# Patient Record
Sex: FEMALE | Race: WHITE | NOT HISPANIC OR LATINO | Employment: UNEMPLOYED | URBAN - METROPOLITAN AREA
[De-identification: names, ages, dates, MRNs, and addresses within clinical notes are randomized per-mention and may not be internally consistent; named-entity substitution may affect disease eponyms.]

---

## 2017-03-23 ENCOUNTER — COMMUNICATION - HEALTHEAST (OUTPATIENT)
Dept: BEHAVIORAL HEALTH | Facility: CLINIC | Age: 42
End: 2017-03-23

## 2017-03-23 DIAGNOSIS — F41.9 ANXIETY: ICD-10-CM

## 2017-06-12 ENCOUNTER — OFFICE VISIT - HEALTHEAST (OUTPATIENT)
Dept: BEHAVIORAL HEALTH | Facility: CLINIC | Age: 42
End: 2017-06-12

## 2017-06-12 DIAGNOSIS — F06.30 MOOD DISORDER IN CONDITIONS CLASSIFIED ELSEWHERE: ICD-10-CM

## 2017-06-12 ASSESSMENT — MIFFLIN-ST. JEOR: SCORE: 1976.05

## 2017-07-26 ENCOUNTER — COMMUNICATION - HEALTHEAST (OUTPATIENT)
Dept: BEHAVIORAL HEALTH | Facility: CLINIC | Age: 42
End: 2017-07-26

## 2017-07-26 DIAGNOSIS — F41.9 ANXIETY: ICD-10-CM

## 2017-07-31 ENCOUNTER — COMMUNICATION - HEALTHEAST (OUTPATIENT)
Dept: BEHAVIORAL HEALTH | Facility: CLINIC | Age: 42
End: 2017-07-31

## 2017-07-31 DIAGNOSIS — F41.9 ANXIETY: ICD-10-CM

## 2017-10-16 ENCOUNTER — OFFICE VISIT - HEALTHEAST (OUTPATIENT)
Dept: BEHAVIORAL HEALTH | Facility: CLINIC | Age: 42
End: 2017-10-16

## 2017-10-16 DIAGNOSIS — F06.30 MOOD DISORDER IN CONDITIONS CLASSIFIED ELSEWHERE: ICD-10-CM

## 2017-10-16 ASSESSMENT — MIFFLIN-ST. JEOR: SCORE: 1962.44

## 2018-02-08 ENCOUNTER — COMMUNICATION - HEALTHEAST (OUTPATIENT)
Dept: BEHAVIORAL HEALTH | Facility: CLINIC | Age: 43
End: 2018-02-08

## 2018-02-08 DIAGNOSIS — F06.30 MOOD DISORDER IN CONDITIONS CLASSIFIED ELSEWHERE: ICD-10-CM

## 2018-02-19 ENCOUNTER — COMMUNICATION - HEALTHEAST (OUTPATIENT)
Dept: BEHAVIORAL HEALTH | Facility: CLINIC | Age: 43
End: 2018-02-19

## 2018-02-26 ENCOUNTER — COMMUNICATION - HEALTHEAST (OUTPATIENT)
Dept: BEHAVIORAL HEALTH | Facility: CLINIC | Age: 43
End: 2018-02-26

## 2018-02-26 DIAGNOSIS — F41.9 ANXIETY: ICD-10-CM

## 2018-04-24 ENCOUNTER — COMMUNICATION - HEALTHEAST (OUTPATIENT)
Dept: BEHAVIORAL HEALTH | Facility: CLINIC | Age: 43
End: 2018-04-24

## 2018-04-24 DIAGNOSIS — F06.30 MOOD DISORDER IN CONDITIONS CLASSIFIED ELSEWHERE: ICD-10-CM

## 2019-01-16 ENCOUNTER — COMMUNICATION - HEALTHEAST (OUTPATIENT)
Dept: BEHAVIORAL HEALTH | Facility: CLINIC | Age: 44
End: 2019-01-16

## 2019-01-16 DIAGNOSIS — F41.9 ANXIETY: ICD-10-CM

## 2019-01-17 ENCOUNTER — COMMUNICATION - HEALTHEAST (OUTPATIENT)
Dept: BEHAVIORAL HEALTH | Facility: CLINIC | Age: 44
End: 2019-01-17

## 2019-01-28 ENCOUNTER — OFFICE VISIT - HEALTHEAST (OUTPATIENT)
Dept: BEHAVIORAL HEALTH | Facility: CLINIC | Age: 44
End: 2019-01-28

## 2019-01-28 DIAGNOSIS — F06.30 MOOD DISORDER IN CONDITIONS CLASSIFIED ELSEWHERE: ICD-10-CM

## 2019-01-28 DIAGNOSIS — F41.9 ANXIETY: ICD-10-CM

## 2019-01-28 ASSESSMENT — MIFFLIN-ST. JEOR: SCORE: 1953.37

## 2019-01-29 ENCOUNTER — COMMUNICATION - HEALTHEAST (OUTPATIENT)
Dept: BEHAVIORAL HEALTH | Facility: CLINIC | Age: 44
End: 2019-01-29

## 2019-07-10 ENCOUNTER — COMMUNICATION - HEALTHEAST (OUTPATIENT)
Dept: BEHAVIORAL HEALTH | Facility: CLINIC | Age: 44
End: 2019-07-10

## 2019-07-10 DIAGNOSIS — F41.9 ANXIETY: ICD-10-CM

## 2019-08-13 ENCOUNTER — COMMUNICATION - HEALTHEAST (OUTPATIENT)
Dept: BEHAVIORAL HEALTH | Facility: CLINIC | Age: 44
End: 2019-08-13

## 2019-08-19 ENCOUNTER — OFFICE VISIT - HEALTHEAST (OUTPATIENT)
Dept: BEHAVIORAL HEALTH | Facility: CLINIC | Age: 44
End: 2019-08-19

## 2019-08-19 DIAGNOSIS — F41.9 ANXIETY: ICD-10-CM

## 2019-08-19 DIAGNOSIS — F06.30 MOOD DISORDER IN CONDITIONS CLASSIFIED ELSEWHERE: ICD-10-CM

## 2019-08-19 ASSESSMENT — MIFFLIN-ST. JEOR: SCORE: 1962.44

## 2020-02-28 ENCOUNTER — COMMUNICATION - HEALTHEAST (OUTPATIENT)
Dept: BEHAVIORAL HEALTH | Facility: CLINIC | Age: 45
End: 2020-02-28

## 2020-02-28 ENCOUNTER — OFFICE VISIT - HEALTHEAST (OUTPATIENT)
Dept: BEHAVIORAL HEALTH | Facility: CLINIC | Age: 45
End: 2020-02-28

## 2020-02-28 DIAGNOSIS — F39 MOOD DISORDER (H): ICD-10-CM

## 2020-02-28 DIAGNOSIS — F41.9 ANXIETY: ICD-10-CM

## 2020-02-28 ASSESSMENT — MIFFLIN-ST. JEOR: SCORE: 1962.44

## 2020-06-25 ENCOUNTER — COMMUNICATION - HEALTHEAST (OUTPATIENT)
Dept: BEHAVIORAL HEALTH | Facility: CLINIC | Age: 45
End: 2020-06-25

## 2020-06-25 DIAGNOSIS — F06.30 MOOD DISORDER IN CONDITIONS CLASSIFIED ELSEWHERE: ICD-10-CM

## 2020-07-03 ENCOUNTER — COMMUNICATION - HEALTHEAST (OUTPATIENT)
Dept: BEHAVIORAL HEALTH | Facility: CLINIC | Age: 45
End: 2020-07-03

## 2020-07-06 ENCOUNTER — OFFICE VISIT - HEALTHEAST (OUTPATIENT)
Dept: BEHAVIORAL HEALTH | Facility: CLINIC | Age: 45
End: 2020-07-06

## 2020-07-06 DIAGNOSIS — F41.9 ANXIETY: ICD-10-CM

## 2020-07-06 DIAGNOSIS — F39 MOOD DISORDER (H): ICD-10-CM

## 2020-07-20 ENCOUNTER — COMMUNICATION - HEALTHEAST (OUTPATIENT)
Dept: BEHAVIORAL HEALTH | Facility: CLINIC | Age: 45
End: 2020-07-20

## 2020-07-20 DIAGNOSIS — F06.30 MOOD DISORDER IN CONDITIONS CLASSIFIED ELSEWHERE: ICD-10-CM

## 2020-08-19 ENCOUNTER — OFFICE VISIT - HEALTHEAST (OUTPATIENT)
Dept: FAMILY MEDICINE | Facility: CLINIC | Age: 45
End: 2020-08-19

## 2020-08-19 ENCOUNTER — RECORDS - HEALTHEAST (OUTPATIENT)
Dept: GENERAL RADIOLOGY | Facility: CLINIC | Age: 45
End: 2020-08-19

## 2020-08-19 DIAGNOSIS — S69.91XA UNSPECIFIED INJURY OF RIGHT WRIST, HAND AND FINGER(S), INITIAL ENCOUNTER: ICD-10-CM

## 2020-08-19 DIAGNOSIS — S69.91XA INJURY OF FINGER OF RIGHT HAND, INITIAL ENCOUNTER: ICD-10-CM

## 2020-08-19 DIAGNOSIS — S63.612A SPRAIN OF RIGHT MIDDLE FINGER, UNSPECIFIED SITE OF DIGIT, INITIAL ENCOUNTER: ICD-10-CM

## 2020-08-31 ENCOUNTER — COMMUNICATION - HEALTHEAST (OUTPATIENT)
Dept: BEHAVIORAL HEALTH | Facility: CLINIC | Age: 45
End: 2020-08-31

## 2020-08-31 DIAGNOSIS — F06.30 MOOD DISORDER IN CONDITIONS CLASSIFIED ELSEWHERE: ICD-10-CM

## 2020-10-01 ENCOUNTER — COMMUNICATION - HEALTHEAST (OUTPATIENT)
Dept: BEHAVIORAL HEALTH | Facility: CLINIC | Age: 45
End: 2020-10-01

## 2020-10-01 DIAGNOSIS — F41.9 ANXIETY: ICD-10-CM

## 2020-10-01 DIAGNOSIS — F06.30 MOOD DISORDER IN CONDITIONS CLASSIFIED ELSEWHERE: ICD-10-CM

## 2020-10-14 ENCOUNTER — COMMUNICATION - HEALTHEAST (OUTPATIENT)
Dept: BEHAVIORAL HEALTH | Facility: CLINIC | Age: 45
End: 2020-10-14

## 2020-10-29 ENCOUNTER — COMMUNICATION - HEALTHEAST (OUTPATIENT)
Dept: BEHAVIORAL HEALTH | Facility: CLINIC | Age: 45
End: 2020-10-29

## 2020-10-29 DIAGNOSIS — F39 MOOD DISORDER (H): ICD-10-CM

## 2020-11-04 ENCOUNTER — OFFICE VISIT - HEALTHEAST (OUTPATIENT)
Dept: BEHAVIORAL HEALTH | Facility: CLINIC | Age: 45
End: 2020-11-04

## 2020-11-04 DIAGNOSIS — F32.0 CURRENT MILD EPISODE OF MAJOR DEPRESSIVE DISORDER, UNSPECIFIED WHETHER RECURRENT (H): ICD-10-CM

## 2020-12-01 ENCOUNTER — COMMUNICATION - HEALTHEAST (OUTPATIENT)
Dept: BEHAVIORAL HEALTH | Facility: CLINIC | Age: 45
End: 2020-12-01

## 2020-12-01 DIAGNOSIS — F06.30 MOOD DISORDER IN CONDITIONS CLASSIFIED ELSEWHERE: ICD-10-CM

## 2021-01-27 ENCOUNTER — OFFICE VISIT - HEALTHEAST (OUTPATIENT)
Dept: BEHAVIORAL HEALTH | Facility: CLINIC | Age: 46
End: 2021-01-27

## 2021-01-27 DIAGNOSIS — F39 MOOD DISORDER (H): ICD-10-CM

## 2021-01-27 DIAGNOSIS — F06.30 MOOD DISORDER IN CONDITIONS CLASSIFIED ELSEWHERE: ICD-10-CM

## 2021-01-27 DIAGNOSIS — F41.9 ANXIETY: ICD-10-CM

## 2021-01-27 DIAGNOSIS — Z51.81 THERAPEUTIC DRUG MONITORING: ICD-10-CM

## 2021-01-27 RX ORDER — VENLAFAXINE HYDROCHLORIDE 150 MG/1
CAPSULE, EXTENDED RELEASE ORAL
Qty: 60 CAPSULE | Refills: 5 | Status: SHIPPED | OUTPATIENT
Start: 2021-01-27 | End: 2022-01-12

## 2021-01-27 RX ORDER — DIVALPROEX SODIUM 500 MG/1
1000 TABLET, EXTENDED RELEASE ORAL 2 TIMES DAILY
Qty: 120 TABLET | Refills: 5 | Status: SHIPPED | OUTPATIENT
Start: 2021-01-27 | End: 2023-10-03

## 2021-01-27 RX ORDER — QUETIAPINE FUMARATE 200 MG/1
200 TABLET, FILM COATED ORAL AT BEDTIME
Qty: 30 TABLET | Refills: 11 | Status: SHIPPED | OUTPATIENT
Start: 2021-01-27 | End: 2021-12-02

## 2021-02-03 ENCOUNTER — AMBULATORY - HEALTHEAST (OUTPATIENT)
Dept: MULTI SPECIALTY CLINIC | Facility: CLINIC | Age: 46
End: 2021-02-03

## 2021-02-03 LAB
ALT SERPL W/O P-5'-P-CCNC: 14 U/L (ref 0–55)
AST SERPL-CCNC: 17 U/L (ref 10–40)

## 2021-02-04 ENCOUNTER — COMMUNICATION - HEALTHEAST (OUTPATIENT)
Dept: BEHAVIORAL HEALTH | Facility: CLINIC | Age: 46
End: 2021-02-04

## 2021-02-08 ENCOUNTER — COMMUNICATION - HEALTHEAST (OUTPATIENT)
Dept: BEHAVIORAL HEALTH | Facility: CLINIC | Age: 46
End: 2021-02-08

## 2021-02-26 ENCOUNTER — COMMUNICATION - HEALTHEAST (OUTPATIENT)
Dept: BEHAVIORAL HEALTH | Facility: CLINIC | Age: 46
End: 2021-02-26

## 2021-03-01 ENCOUNTER — COMMUNICATION - HEALTHEAST (OUTPATIENT)
Dept: BEHAVIORAL HEALTH | Facility: CLINIC | Age: 46
End: 2021-03-01

## 2021-03-09 ENCOUNTER — COMMUNICATION - HEALTHEAST (OUTPATIENT)
Dept: BEHAVIORAL HEALTH | Facility: CLINIC | Age: 46
End: 2021-03-09

## 2021-03-09 DIAGNOSIS — F06.30 MOOD DISORDER IN CONDITIONS CLASSIFIED ELSEWHERE: ICD-10-CM

## 2021-03-31 ENCOUNTER — COMMUNICATION - HEALTHEAST (OUTPATIENT)
Dept: BEHAVIORAL HEALTH | Facility: CLINIC | Age: 46
End: 2021-03-31

## 2021-03-31 DIAGNOSIS — F06.30 MOOD DISORDER IN CONDITIONS CLASSIFIED ELSEWHERE: ICD-10-CM

## 2021-04-08 ENCOUNTER — COMMUNICATION - HEALTHEAST (OUTPATIENT)
Dept: BEHAVIORAL HEALTH | Facility: CLINIC | Age: 46
End: 2021-04-08

## 2021-04-08 DIAGNOSIS — F06.30 MOOD DISORDER IN CONDITIONS CLASSIFIED ELSEWHERE: ICD-10-CM

## 2021-04-12 RX ORDER — QUETIAPINE FUMARATE 100 MG/1
TABLET, FILM COATED ORAL
Qty: 28 TABLET | Refills: 12 | Status: SHIPPED | OUTPATIENT
Start: 2021-04-12 | End: 2022-03-25

## 2021-04-21 ENCOUNTER — OFFICE VISIT - HEALTHEAST (OUTPATIENT)
Dept: BEHAVIORAL HEALTH | Facility: CLINIC | Age: 46
End: 2021-04-21

## 2021-04-21 DIAGNOSIS — F39 MOOD DISORDER (H): ICD-10-CM

## 2021-05-30 NOTE — TELEPHONE ENCOUNTER
Date of Last Office Visit: 1/28/19  Date of Next Office Visit: 7/29/19  No shows since last visit: no  Cancellations since last visit: no  ED visits since last visit: no    Medication Both date last ordered: 1/28/19  Qty: 1 month  Refills: 3    Lapse in therapy greater than 7 days: appears yes     Medication refill request verified as identical to current order: yes except asking for 10 refills  Result of Last DAM, VPA, Li+ Level, CBC, or Carbamazepine Level (at or since last visit): N/A        []Eligibility - not seen in last year    []Supervision - no future appointment    [x]Compliance Should be out of meds  []Verification - order discrepancy    []Controlled Medication    []90 - day supply request    [x]Other LPN pending medications    Current Medication list:    acetaminophen (TYLENOL) 325 MG tablet Take 650 mg by mouth every 4 (four) hours as needed for pain.   calcium, as carbonate, (TUMS) 200 mg calcium (500 mg) chewable tablet Chew 1-2 tablets as needed for heartburn.   divalproex (DEPAKOTE ER) 500 MG 24 hour tablet Take 2 tablets (1,000 mg total) by mouth 2 (two) times a day.   loratadine (CLARITIN) 10 mg tablet Take 10 mg by mouth daily.    LORazepam (ATIVAN) 0.5 MG tablet Take 1 tablet (0.5 mg total) by mouth every 6 (six) hours as needed for anxiety.   medroxyPROGESTERone (DEPO-PROVERA) 400 mg/mL Soln 150 mg.   omeprazole (PRILOSEC) 20 MG capsule Take 20 mg by mouth 2 (two) times a day.   QUEtiapine (SEROQUEL) 200 MG tablet TAKE 1 TAB (200 MG TOTAL) BY MOUTH 2 (TWO) TIMES A DAY.   venlafaxine (EFFEXOR-XR) 150 MG 24 hr capsule 2 CAPSULES (300MG) BY MOUTH DAILY         Medication Plan of Care at last office visit with MD/CNP:    PLAN:  Plan:  Medication Adjustment:  We will continue with the current treatment plan.     Other:   Patient will return in 6 months for med check.  The staff and patient agree to call or return sooner questions concerns or problems arise.    [place], [place], [place], and [place] : NA

## 2021-05-31 ENCOUNTER — RECORDS - HEALTHEAST (OUTPATIENT)
Dept: ADMINISTRATIVE | Facility: CLINIC | Age: 46
End: 2021-05-31

## 2021-05-31 VITALS — BODY MASS INDEX: 48.82 KG/M2 | HEIGHT: 65 IN | WEIGHT: 293 LBS

## 2021-05-31 VITALS — BODY MASS INDEX: 48.65 KG/M2 | WEIGHT: 292 LBS | HEIGHT: 65 IN

## 2021-05-31 NOTE — PROGRESS NOTES
Patient is here for follow up and medication management.    Patient states that things are going well. Sleep is good.  No anxiety.    Discus=0

## 2021-05-31 NOTE — PROGRESS NOTES
Outpatient Followup Psychiatric Evaluation      Pertinent History: Patient presents for the purposes of medication management.  The patient is been doing relatively well on the current medication regime.  She has a history of developmental delay.  She's had a history of mood instability and behavioral dyscontrol.     I saw the patient in October 2018.  She was doing quite well at that time and there were no medication changes.    I saw the patient in January 2019.  At that time the patient was doing quite well with no complaints.  She was looking into doing volunteer work.  We did not make any medication changes at that time.    Current Symptoms:   Both the patient and staff member report the patient is doing very well.  They report her mood is been good.  She has occasional verbal outbursts when she has conflict with staff but it self-limited and she is redirectable.  No change in cognition.  She is sleeping well.  No change in appetite.  No hallucinations or delusions and she reports that she does not have any concerns or questions and does not feel she needs any medication changes.    The medical team is working on some weight loss strategies.  We did talk about the possible role that Seroquel could have an weight gain but at this point they like to continue with the medication and try some lifestyle changes.  Patient's discus was 0.  No other new medical diagnoses.  No new allergies.  No side effects to the medication.    I did review and fill out the patient's staff's paperwork.      Current Medications: Please see chart. Medications personally reviewed.    Medication Compliance: yes    Side Effects to Medications:  no      Vitals:  Wt Readings from Last 3 Encounters:   08/19/19 (!) 292 lb (132.5 kg)   01/28/19 (!) 290 lb (131.5 kg)   10/16/17 (!) 292 lb (132.5 kg)     Temp Readings from Last 3 Encounters:   10/16/17 97.7  F (36.5  C) (Oral)   04/04/16 97.2  F (36.2  C) (Oral)   12/28/15 97.7  F (36.5  C)  (Oral)     BP Readings from Last 3 Encounters:   08/19/19 125/81   01/28/19 125/77   10/16/17 119/77     Pulse Readings from Last 3 Encounters:   08/19/19 (!) 119   01/28/19 91   10/16/17 99         Mental Status Exam:   Appearance: No obvious pain or distress.  No agitation.  No shortness of breath.  Patient appears relatively comfortable.  Adequately groomed and dressed.  Behavior: The patient is under fairly good behavioral control during the interview.  No agitation.  No reports of any significant recent behavioral dyscontrol.  Speech: Somewhat soft-spoken.  Fairly simple but appropriate responses.  Not pressured or rambling.  Mood/Affect: Not significantly depressed or anxious.  She is baseline slightly flat.  No irritability.  No lability.  No evidence of any tiit.  Thought Content: No obvious apparent psychosis.  No reports of any psychotic symptoms recently.  Suicidal or Homicidal Thoughts: No evidence or reports of any suicidal or homicidal ideation  Thought Process/Formulation: Somewhat concrete.  Somewhat vague.  Does needs some structure and occasional prompts.  Associations: No obvious recent change.  Patient is somewhat concrete but is processing some information.  Fund of Knowledge: Continues impaired with no recent change.  Attention/Concentration: Concentration and attention continue to be somewhat impaired.  No obvious significant change.  Insight: Continues impaired.  No recent change.  Judgement:  Grossly unchanged.  Continues impaired  Memory: No obvious recent change.  Needing prompts and structure.    Motor Status:  No recent change reported. No current tremor.   Orientation: No reports of any recent change..    Diagnosis managed and treated at today's visit :    Psychosis, NOS  Major depressive disorder, recurrent, chronic, moderate, possibly with psychotic features  Intellectual disability, mild      Plan:  Medication Adjustment:  I will make no changes at this time.    Other:   Patient  will return in 6 months for med check.  The staff and patient agree to call or return sooner questions concerns or problems arise.    Continue with the support of the clinic, reassurance, and redirection. Staff monitoring and ongoing assessments per team plan. Current psychotropic medication appears to represent the minimum effective dosage and appears medically necessary. We will continue to monitor and reassess. This team will utilize appropriate emergency services if necessary. I will make myself available if concerns or problems arise.    Marito Matt MD

## 2021-05-31 NOTE — PROGRESS NOTES
Correct pharmacy verified with patient and confirmed in snapshot? [x] yes []no    Charge captured ? [x] yes  [] no    Medications Phoned  to Pharmacy [] yes [x]no  Name of Pharmacist:  List Medications, including dose, quantity and instructions      Medication Prescriptions given to patient   [] yes  [x] no   List the name of the drug the prescription was written for.       Medications ordered this visit were e-scribed.  Verified by order class [x] yes  [] no    Medication changes or discontinuations were communicated to patient's pharmacy: [] yes  [x] no    UA collected [] yes  [x] no    [place] Prescription Monitoring Program Reviewed? [] yes  [x] no    Referrals were made to:  none    Future appointment was made: [x] yes  [] no    Dictation completed at time of chart check: [x] yes  [] no    I have checked the documentation for today s encounters and the above information has been reviewed and completed.

## 2021-06-02 VITALS — HEIGHT: 65 IN | WEIGHT: 290 LBS | BODY MASS INDEX: 48.32 KG/M2

## 2021-06-03 VITALS — WEIGHT: 292 LBS | BODY MASS INDEX: 48.65 KG/M2 | HEIGHT: 65 IN

## 2021-06-04 VITALS
SYSTOLIC BLOOD PRESSURE: 115 MMHG | WEIGHT: 292 LBS | HEIGHT: 65 IN | HEART RATE: 94 BPM | DIASTOLIC BLOOD PRESSURE: 74 MMHG | BODY MASS INDEX: 48.65 KG/M2

## 2021-06-04 VITALS
RESPIRATION RATE: 20 BRPM | HEART RATE: 122 BPM | OXYGEN SATURATION: 95 % | TEMPERATURE: 98.2 F | WEIGHT: 291 LBS | SYSTOLIC BLOOD PRESSURE: 111 MMHG | DIASTOLIC BLOOD PRESSURE: 82 MMHG | BODY MASS INDEX: 49.18 KG/M2

## 2021-06-06 NOTE — PROGRESS NOTES
Outpatient Followup Psychiatric Evaluation      Pertinent History: Patient presents for the purposes of medication management.  The patient is been doing relatively well on the current medication regime.  She has a history of developmental delay.  She's had a history of mood instability and behavioral dyscontrol.     I saw the patient in October 2018.  She was doing quite well at that time and there were no medication changes.    I saw the patient in January 2019.  At that time the patient was doing quite well with no complaints.  She was looking into doing volunteer work.  We did not make any medication changes at that time.    I saw the patient in August 2019.  She was doing relatively well but still with occasional verbal outbursts when she was redirected.  She was overall however stable and the staff was able to manage her.  We did not make any psychotropic medication changes at that time.    Current Symptoms:   Patient presents with both her parents as well as a staff member and all report the patient is doing extremely well.  The patient however has had ongoing issues of being overweight as well as some tiredness which appears for a couple hours after taking her morning Seroquel.    The patient denies having any depression.  No feelings of hopelessness or helplessness or worthlessness.  No desire to be dead or thoughts of suicide.  The patient reports no psychotic symptoms.  No new medical issues or new diagnoses.  No side effects to the medication.  Discus today is 0.    We discussed the possibility of an attempt to reduce Seroquel.  Risks and benefits were discussed all were in agreement to attempt a reduction.    I did review and fill out the patient's staff's paperwork.      Current Medications: Please see chart. Medications personally reviewed.    Medication Compliance: yes    Side Effects to Medications:  no      Vitals:  Wt Readings from Last 3 Encounters:   02/28/20 (!) 292 lb (132.5 kg)   08/19/19 (!)  292 lb (132.5 kg)   01/28/19 (!) 290 lb (131.5 kg)     Temp Readings from Last 3 Encounters:   10/16/17 97.7  F (36.5  C) (Oral)   04/04/16 97.2  F (36.2  C) (Oral)   12/28/15 97.7  F (36.5  C) (Oral)     BP Readings from Last 3 Encounters:   02/28/20 115/74   08/19/19 125/81   01/28/19 125/77     Pulse Readings from Last 3 Encounters:   02/28/20 94   08/19/19 (!) 119   01/28/19 91         Mental Status Exam:   Appearance: Patient presents appearing relatively comfortable.  No obvious distress.  Not short of breath.  No obvious pain.  Behavior: Patient maintains good behavioral control.  He is currently not restless or agitated.  Speech: No obvious recent change.  Simple answers.  Fairly concrete.  He continues to need structure and prompts.  Soft-spoken and monotone.  Mood/Affect: Not significantly depressed or anxious.  No lability or agitation.  Thought Content: No reports of any recent psychosis.  No evidence of any psychosis.  Suicidal or Homicidal Thoughts:  None apparent or reported. The patient denies any suicidal or homicidal ideation.   Thought Process/Formulation: Slow.  Somewhat concrete and vague.  He is able to track and follow some conversation.  No racing thoughts.  Associations: No obvious recent change.  Somewhat concrete.  Able to process some simple information.  Fund of Knowledge: No significant recent change.  He continues somewhat impaired.  Attention/Concentration: Slow.  Somewhat concrete.  Still with impaired concentration.  Insight:  Grossly unchanged.  Continues impaired  Judgement:  Grossly unchanged.  Continues impaired  Memory:  Grossly fair.  Needing prompts and structure.    Motor Status:  No recent change reported. No current tremor.   Orientation: No reports of any recent change.    Diagnosis managed and treated at today's visit :    Psychosis, NOS  Major depressive disorder, recurrent, chronic, moderate, possibly with psychotic features  Intellectual disability,  mild      Plan:  Medication Adjustment:  I will make no changes at this time.    Other:   I am going to decrease the a.m. Seroquel to 100 mg.  We will continue with 200 mg of Seroquel at night.  The team agrees to call in a month or so with an update and we will consider a future reduction at that time.    Continue with the support of the clinic, reassurance, and redirection. Staff monitoring and ongoing assessments per team plan. Current psychotropic medication appears to represent the minimum effective dosage and appears medically necessary. We will continue to monitor and reassess. This team will utilize appropriate emergency services if necessary. I will make myself available if concerns or problems arise.    Marito Matt MD

## 2021-06-06 NOTE — TELEPHONE ENCOUNTER
[facility] called to clarify the Seroquel dose:  Prescriptions we sent today have controversial sigs.    Reviewed treatment plan and updated Thi that pt should be taking 100 mg in am and 200 mg Q HS.      Plan:  Medication Adjustment:  I will make no changes at this time.     Other:   I am going to decrease the a.m. Seroquel to 100 mg.  We will continue with 200 mg of Seroquel at night.  The team agrees to call in a month or so with an update and we will consider a future reduction at that time.     Continue with the support of the clinic, reassurance, and redirection. Staff monitoring and ongoing assessments per team plan. Current psychotropic medication appears to represent the minimum effective dosage and appears medically necessary. We will continue to monitor and reassess. This team will utilize appropriate emergency services if necessary. I will make myself available if concerns or problems arise.     Marito Matt MD

## 2021-06-06 NOTE — PROGRESS NOTES
Correct pharmacy verified with patient and confirmed in snapshot? [x] yes []no    Charge captured ? [x] yes  [] no    Medications Phoned  to Pharmacy [] yes [x]no  Name of Pharmacist:  List Medications, including dose, quantity and instructions      Medication Prescriptions given to patient   [] yes  [x] no   List the name of the drug the prescription was written for.       Medications ordered this visit were e-scribed.  Verified by order class [x] yes  [] no   Effexor- mg; Seroquel 200 mg (dose change); Seroquel 200 mg HS  Medication changes or discontinuations were communicated to patient's pharmacy: [] yes  [x] no    UA collected [] yes  [x] no    [place] Prescription Monitoring Program Reviewed? [x] yes  [] no    Referrals were made to:  None    Future appointment was made: [x] yes  [] no  5/18/2020  Dictation completed at time of chart check: [x] yes  [] no    I have checked the documentation for today s encounters and the above information has been reviewed and completed.

## 2021-06-09 NOTE — TELEPHONE ENCOUNTER
Chadr tried reaching out to guardian on file Joel Nelson but number is no longer in service and we have no other contact information.   Chadr was calling to get verbal consent for Monday's telephone visit with provider.   Unable to obtain verbal consent as of 7/03/2020.    Will confirm with [facility] staff who is current guardian and maybe obtain current contact number at 7/06/2020 visit.

## 2021-06-09 NOTE — TELEPHONE ENCOUNTER
Called [facility] Ally and left a message for her to return the call to nurse triage.

## 2021-06-09 NOTE — PROGRESS NOTES
This video/telephone visit will be conducted via a call between you and your physician/provider. We have found that certain health care needs can be provided without the need for an in-person physical exam. This service lets us provide the care you need with a video /telephone conversation. If a prescription is necessary we can send it directly to your pharmacy. If lab work is needed we can place an order for that and you can then stop by our lab to have the test done at a later time.    Just as we bill insurance for in-person visits, we also bill insurance for video/telephone visits. If you have questions about your insurance coverage, we recommend that you speak with your insurance company.    Patient has given verbal consent for video/Telephone visit? Yes  Patient would like the telephone visit please call: [phone]  Shanika ROWE  [facility]  Staff verified allergies, medications and pharmacy via phone.  Patient states she   is ready for visit.

## 2021-06-09 NOTE — TELEPHONE ENCOUNTER
Ally returned the call to triage. Explained the situation. Pt should have enough depakote until around 7/19/20. Ally is going to contact pt's PCP to see if she will prescribe a one month bridge to get pt to next [facility] appt. They will call us back if PCP does not agree to plan.

## 2021-06-09 NOTE — PROGRESS NOTES
________________________________________  Medications Phoned  to Pharmacy [] yes [x]no  Name of Pharmacist:  List Medications, including dose, quantity and instructions    Medications ordered this visit were e-scribed.  Verified by order class [x] yes  [] no  Effexor- mg; Seroquel 200 mg & 200 mg   Medication changes or discontinuations were communicated to patient's pharmacy: [] yes  [x] no    Dictation completed at time of chart check: [x] yes  [] no    I have checked the documentation for today s encounters and the above information has been reviewed and completed.

## 2021-06-09 NOTE — PATIENT INSTRUCTIONS - HE
She will continue with the current medication regime.  She is to call with any questions, concerns or problems.  The patient seems to be tolerating the medication and is showing good participation.  Will continue with the support services as currently in place.

## 2021-06-09 NOTE — TELEPHONE ENCOUNTER
[facility] home called as they have received all of patients medications except for the Depakote which they were told was denied by provider.    They have enough for today- can we refill ASAP.    [facility] [address] [phone] (Phone)  [phone] (Fax)     Next appt: 10/5/20          Thanks!

## 2021-06-09 NOTE — TELEPHONE ENCOUNTER
Medication was e-scribed by provider on 7/20/20. Confirmed with [facility] pharmacy and medication was already sent out to patient.

## 2021-06-09 NOTE — TELEPHONE ENCOUNTER
Date of Last Office Visit: 7/6/2020  Date of Next Office Visit: 10/02/2020  No shows since last visit: none  Cancellations since last visit: none  ED visits since last visit:  none    Medication Depakote ER date last ordered: 8/19/2020  Qty: 120  Refills: 10    Lapse in therapy greater than 7 days: none  Medication refill request verified as identical to current order: yes  Result of Last DAM, VPA, Li+ Level, CBC, or Carbamazepine Level (at or since last visit): n/a     [] Medication refilled per [facility] M-1.   [x] Medication unable to be refilled by RN due to criteria not met as indicated below:     []Eligibility - not seen in last year    []Supervision - no future appointment    []Compliance     []Verification - order discrepancy    []Controlled Medication    [x]Medication not included in RN Protocol    []90 - day supply request    []Other   Current Medication list:  Paige Nelson    (MRN [id number])   Your Current Medications Are     acetaminophen (TYLENOL) 325 MG tablet  Take 650 mg by mouth every 4 (four) hours as needed for pain.    calcium, as carbonate, (TUMS) 200 mg calcium (500 mg) chewable tablet  Chew 1-2 tablets as needed for heartburn.    divalproex (DEPAKOTE ER) 500 MG 24 hour tablet  Take 2 tablets (1,000 mg total) by mouth 2 (two) times a day.    loratadine (CLARITIN) 10 mg tablet  Take 10 mg by mouth daily.    lorazepam (ATIVAN ORAL)  Take 10 mg by mouth every 6 (six) hours as needed.    medroxyPROGESTERone (DEPO-PROVERA) 400 mg/mL Soln  150 mg.    omeprazole (PRILOSEC) 20 MG capsule  Take 20 mg by mouth 2 (two) times a day.    QUEtiapine (SEROQUEL) 200 MG tablet  Take 1 tablet (200 mg total) by mouth at bedtime.    QUEtiapine (SEROQUEL) 200 MG tablet  Take 0.5 tablets (100 mg total) by mouth daily.    venlafaxine (EFFEXOR-XR) 150 MG 24 hr capsule  2 CAPSULES (300MG) BY MOUTH DAILY        Medication Plan of Care at last office visit with MD/CNP:     Plan:  Medication Adjustment:  We will continue  with the current treatment plan.     Other:   We will continue with the current level of support services and continue to monitor mood and behavior.  We will try and encourage healthy lifestyle choices and increase in physical activity.  We will continue with our medication management monitoring        Continue with the support of the clinic, reassurance, and redirection. Staff monitoring and ongoing assessments per team plan. Current psychotropic medication appears to represent the minimum effective dosage and appears medically necessary. We will continue to monitor and reassess. This team will utilize appropriate emergency services if necessary. I will make myself available if concerns or problems arise.

## 2021-06-09 NOTE — TELEPHONE ENCOUNTER
Returned call to Ally, but had to leave message. Explained that Dr. Matt is on [facility] and asking PCP to bridge one month supply of medication. Instructed her to call back to schedule appt with Shaka and let us know if PCP will be covering bridge for depakote.

## 2021-06-09 NOTE — PROGRESS NOTES
Outpatient Followup Psychiatric Evaluation    Pertinent History: Patient presents for the purposes of medication management.  The patient is been doing relatively well on the current medication regime.  She has a history of developmental delay.  She's had a history of mood instability and behavioral dyscontrol.     I saw the patient in October 2018.  She was doing quite well at that time and there were no medication changes.    I saw the patient in January 2019.  At that time the patient was doing quite well with no complaints.  She was looking into doing volunteer work.  We did not make any medication changes at that time.    I saw the patient in August 2019.  She was doing relatively well but still with occasional verbal outbursts when she was redirected.  She was overall however stable and the staff was able to manage her.  We did not make any psychotropic medication changes at that time.    I had a phone conversation with the patient July 6 of 2020.  This was during the coronavirus lockdown.  The patient was able to participate adequately and I had an opportunity to speak with the patient's staff member as well.  The patient's parents were aware of the phone call and told the staff they had no concerns or complaints and they chose not to participate in the interview as they stated things were going well.    Current Symptoms:   Patient told me she was doing well.  We reviewed our last visit as well as the last phone contact which occurred at the end of February.  Since that visit Demondre had been called to clarify the dosages.  There were no recent changes and patient seemed to be tolerating the medication.  She was currently on Seroquel 100 mg in the morning and 200 mg at night with a plan for consideration of future medication reductions.    The last time I physically saw the patient was in August 2019 and she was doing relatively well at that time.  She was having occasional outbursts but was redirectable.   At that visit we had discussed weight loss strategies.  Her discus was 0 at that time and she offers no other specific concerns or complaints.    The patient again tells me she is doing well.  She denied having any significant mood difficulties.  She denied having any depression or anxiety.  No desire to be dead or thoughts of suicide.  She denied change in her cognition and denied having any confusion or change in memory.  She told me she was sleeping well and eating well.  She admitted that she was napping a bit more in part due to the fact that her schedule had lightened up due to the coronavirus restrictions.  She did not feel this was a major concern.    She denied having any new medical concerns or complaints.  She reported she was physically feeling good although at times was tired.  We again discussed my recommendation to try and become a bit more active and she stated she would try and do this.  She reported no other new issues or concerns and again did not feel she needed any medication changes.  She felt that she had adequate structure and supervision and felt she could get her questions or concerns answered by staff members.  She felt she was overall stable and was comfortable with the plan.      Current Medications: Please see chart. Medications personally reviewed.    Medication Compliance: yes    Side Effects to Medications:  no      Vitals:  Wt Readings from Last 3 Encounters:   02/28/20 (!) 292 lb (132.5 kg)   08/19/19 (!) 292 lb (132.5 kg)   01/28/19 (!) 290 lb (131.5 kg)     Temp Readings from Last 3 Encounters:   10/16/17 97.7  F (36.5  C) (Oral)   04/04/16 97.2  F (36.2  C) (Oral)   12/28/15 97.7  F (36.5  C) (Oral)     BP Readings from Last 3 Encounters:   02/28/20 115/74   08/19/19 125/81   01/28/19 125/77     Pulse Readings from Last 3 Encounters:   02/28/20 94   08/19/19 (!) 119   01/28/19 91         Mental Status Exam:   Appearance: Patient was interviewed via a phone visit  today.  Behavior: Patient maintains good behavioral control according to the staff member that were interviewed.  Also, the patient reports no significant behavioral concerns or issues.  She has had chronic occasional irritability and apparently this was baseline.  He is currently not restless or agitated.  Speech: Communication was adequate.  She was a bit slow and vague which was baseline.  She needed occasional prompts to participate but overall did fairly well.  No pressured or rambling quality.  Answers were consistent and appropriate.  Mood/Affect: Not significantly depressed or anxious.  Was baseline flat.  No lability or agitation.  Thought Content: No reports of any recent psychosis.  No evidence of any psychosis.  Patient again denied having any recent psychosis.  The staff did not report any concerns.  Suicidal or Homicidal Thoughts: Patient denied having any suicidal or homicidal ideation.  The staff agreed.  Thought Process/Formulation: Appeared baseline.  Slightly slow.  Not loose.  No racing thoughts.  Not disorganized.  Associations: Baseline.  Able to track and follow conversations.  And occasional prompts.  Not significantly disorganized.  Fund of Knowledge: No significant recent change.  Somewhat concrete but redirectable.  She does better with a structured environment.  He continues somewhat impaired.  All appears to be baseline.  Attention/Concentration: Baseline.  Slightly slow.  A bit vague.  She was however able to attend and participate adequately.  Insight:  Grossly unchanged.  Continues somewhat impaired  Judgement:  Grossly unchanged.  Continues somewhat impaired  Memory: Adequate.  Needing prompts and structure.    Motor Status:  No recent change reported. No current tremor.   Orientation: No reports of any recent change.  Slee oriented    Diagnosis managed and treated at today's visit :    Psychosis, NOS  Major depressive disorder, recurrent, chronic, moderate, possibly with psychotic  features  Intellectual disability, mild      Plan:  Medication Adjustment:  We will continue with the current treatment plan.    Other:   We will continue with the current level of support services and continue to monitor mood and behavior.  We will try and encourage healthy lifestyle choices and increase in physical activity.  We will continue with our medication management monitoring      Continue with the support of the clinic, reassurance, and redirection. Staff monitoring and ongoing assessments per team plan. Current psychotropic medication appears to represent the minimum effective dosage and appears medically necessary. We will continue to monitor and reassess. This team will utilize appropriate emergency services if necessary. I will make myself available if concerns or problems arise.    Marito Matt MD

## 2021-06-10 NOTE — PROGRESS NOTES
Assessment:     1. Sprain of right middle finger, unspecified site of digit, initial encounter     2. Injury of finger of right hand, initial encounter  XR Finger Right 2 or More VWS          Plan:     Right middle finger x-ray ordered and personally reviewed by myself.  No evidence of fracture that I can appreciate.  Likely suggestive of a sprain.  Patient placed in a finger splint.  May take ibuprofen as needed for discomfort.  Discussed the typical course of healing.  Follow-up with her primary care provider if symptoms are getting worse or not improving.        Subjective:       45 y.o. female presents for evaluation of a right middle finger injury that she sustained while she was at work yesterday when another person bent her thumb middle finger of her right hand back.  She has had swelling and pain over the proximal finger since yesterday that has not gotten much better.  She has been icing it and also took some ibuprofen which was helpful.  No other injury, complaints, or concerns today.    Patient Active Problem List   Diagnosis     Unspecified episodic mood disorder     Anxiety state, unspecified     Intellectual disability     Intermittent explosive disorder       Past Medical History:   Diagnosis Date     Constipation      GERD (gastroesophageal reflux disease)      MR (mental retardation)        No past surgical history on file.    Current Outpatient Medications on File Prior to Visit   Medication Sig Dispense Refill     acetaminophen (TYLENOL) 325 MG tablet Take 650 mg by mouth every 4 (four) hours as needed for pain.       calcium, as carbonate, (TUMS) 200 mg calcium (500 mg) chewable tablet Chew 1-2 tablets as needed for heartburn.       divalproex (DEPAKOTE ER) 500 MG 24 hour tablet Take 2 tablets (1,000 mg total) by mouth 2 (two) times a day. 60 tablet 3     loratadine (CLARITIN) 10 mg tablet Take 10 mg by mouth daily.        lorazepam (ATIVAN ORAL) Take 10 mg by mouth every 6 (six) hours as needed.        medroxyPROGESTERone (DEPO-PROVERA) 400 mg/mL Soln 150 mg.       omeprazole (PRILOSEC) 20 MG capsule Take 20 mg by mouth 2 (two) times a day.       QUEtiapine (SEROQUEL) 200 MG tablet Take 1 tablet (200 mg total) by mouth at bedtime. 30 tablet 3     venlafaxine (EFFEXOR-XR) 150 MG 24 hr capsule 2 CAPSULES (300MG) BY MOUTH DAILY 60 capsule 5     [DISCONTINUED] QUEtiapine (SEROQUEL) 200 MG tablet Take 0.5 tablets (100 mg total) by mouth daily. 60 tablet 5     No current facility-administered medications on file prior to visit.        Allergies   Allergen Reactions     Sulfasalazine Hives     Hay Fever And Allergy Relief Other (See Comments)     Nasal congestion     Penicillin V Unknown       No family history on file.    Social History     Socioeconomic History     Marital status: Single     Spouse name: None     Number of children: None     Years of education: None     Highest education level: None   Occupational History     None   Social Needs     Financial resource strain: None     Food insecurity     Worry: None     Inability: None     Transportation needs     Medical: None     Non-medical: None   Tobacco Use     Smoking status: Former Smoker     Types: Cigarettes     Start date: 1994     Last attempt to quit: 1995     Years since quittin.3     Smokeless tobacco: Never Used   Substance and Sexual Activity     Alcohol use: No     Drug use: No     Sexual activity: Not Currently   Lifestyle     Physical activity     Days per week: None     Minutes per session: None     Stress: None   Relationships     Social connections     Talks on phone: None     Gets together: None     Attends [employer] service: None     Active member of club or organization: None     Attends meetings of clubs or organizations: None     Relationship status: None     Intimate partner violence     Fear of current or ex partner: None     Emotionally abused: None     Physically abused: None     Forced sexual activity: None   Other  Topics Concern     None   Social History Narrative     None         Review of Systems  A 12 point comprehensive review of systems was negative except as noted.      Objective:     Vitals:    08/19/20 1735   BP: 111/82   Pulse: (!) 122   Resp: 20   Temp: 98.2  F (36.8  C)   SpO2: 95%      General appearance: alert, appears stated age and cooperative  Extremities: Right middle finger examined and she is noted to have some tenderness over at the proximal phalanx with some mild swelling noted.  No significant erythema or ecchymosis.  There is no deformity.  Range of motion is limited due to pain.  She is neurovascularly intact.  No other areas of injury noted.  Distal finger nontender.      Xr Finger Right 2 Or More Vws    Result Date: 8/19/2020  EXAM: XR FINGER RIGHT 2 OR MORE VWS LOCATION: [facility] DATE/TIME: 8/19/2020 5:56 PM INDICATION: Pain, injury. COMPARISON: None.     3 views of the third finger show no no definitive fracture or malalignment. Faint cortical undulation along the dorsal aspect of the distal phalanx near the DIP joint is felt to be projectional however correlation with point tenderness is recommended. Joint spaces are maintained.            This note has been dictated using voice recognition software. Any grammatical or context distortions are unintentional and inherent to the software

## 2021-06-11 NOTE — PROGRESS NOTES
After visit contacted new group home staff [facility].  Corrected pharmacy in chart and scheduled next appointment.    Correct pharmacy verified with patient and confirmed in snapshot? [x] yes []no    Medications Phoned  to Pharmacy [] yes [x]no  Name of Pharmacist:  List Medications, including dose, quantity and instructions      Medication Prescriptions given to patient   [] yes  [x] no   List the name of the drug the prescription was written for.       Medications ordered this visit were e-scribed.  Verified by order class [] yes  [x] no    Medication changes or discontinuations were communicated to patient's pharmacy: [] yes  [x] no    UA collected [] yes    [x] no    [place] Prescription Monitoring Program Reviewed? [] yes  [x] no    Referrals were made to:  None    Future appointment was made: [x] yes  [] no    Dictation completed at time of chart check: [x] yes  [] no    I have checked the documentation for today s encounters and the above information has been reviewed and completed.

## 2021-06-11 NOTE — TELEPHONE ENCOUNTER
[facility] called and stated pt needed Depakote refill, patient is going on 3 week vacation to Dad's home Checked with pharmacy and they concurred that other meds could be packaged with current supply.    Date of Last Office Visit: 7/6/20  Date of Next Office Visit: 10/14/20  No shows since last visit: none  Cancellations since last visit: none  ED visits since last visit:  none  Medication Divalproex 500 mg 24 hr tab date last ordered: 7/20/20  Qty: 60  Refills: 3  Lapse in therapy greater than 7 days: no  Medication refill request verified as identical to current order: yes  Result of Last DAM, VPA, Li+ Level, CBC, or Carbamazepine Level (at or since last visit): not noted     [] Medication refilled per [facility] M-1.   [x] Medication unable to be refilled by RN due to criteria not met as indicated below:     []Eligibility - not seen in last year    []Supervision - no future appointment    []Compliance     []Verification - order discrepancy    []Controlled Medication    [x]Medication not included in RN Protocol    []90 - day supply request    []Other   Current Medication list:    acetaminophen (TYLENOL) 325 MG tablet Take 650 mg by mouth every 4 (four) hours as needed for pain.   calcium, as carbonate, (TUMS) 200 mg calcium (500 mg) chewable tablet Chew 1-2 tablets as needed for heartburn.   divalproex (DEPAKOTE ER) 500 MG 24 hour tablet Take 2 tablets (1,000 mg total) by mouth 2 (two) times a day.   loratadine (CLARITIN) 10 mg tablet Take 10 mg by mouth daily.    lorazepam (ATIVAN ORAL) Take 10 mg by mouth every 6 (six) hours as needed.   medroxyPROGESTERone (DEPO-PROVERA) 400 mg/mL Soln 150 mg.   omeprazole (PRILOSEC) 20 MG capsule Take 20 mg by mouth 2 (two) times a day.   QUEtiapine (SEROQUEL) 200 MG tablet Take 1 tablet (200 mg total) by mouth at bedtime.   venlafaxine (EFFEXOR-XR) 150 MG 24 hr capsule 2 CAPSULES (300MG) BY MOUTH DAILY       Medication Plan of Care at last office visit with MD/CNP:     Plan:  Medication Adjustment:  We will continue with the current treatment plan.

## 2021-06-11 NOTE — PROGRESS NOTES
Pt here for follow up.    Different housing and things are much calmer since changing house staff to [facility]. [facility] has been updated.  Per parents recent sleep study done and CPAP is currently waiting for approval from Medicare.   Pt had increased morning drowsiness and hoping this will help.   Discus performed today score 0.

## 2021-06-11 NOTE — PROGRESS NOTES
Outpatient Followup Psychiatric Evaluation      Pertinent History: Patient presents for the purposes of medication management.  The patient is been doing relatively well on the current medication regime.  She has a history of developmental delay.  She's had a history of mood instability and behavioral dyscontrol.  We have not made any recent medication changes. At the last visit we discussed the possibility of a reduction in Seroquel but both the patient and staff declined this option.  Patient presents with both her parents today.    Current Symptoms:   He has been doing extremely well.  She apparently is at the same group home but has entirely different staff services.  Since that change things have been much better.  The patient is lost 30 pounds because she no longer goes out to fast food all the time with the staff members.  She has a new roommate and that is going well.  The patient has been involved with activities.  She tells me she is sleeping well.  She denies depression, anxiety or any hallucinations or delusions.  She reports no side effects to the medication.  We again discussed the risks and benefits of the medication including the risk of tardive dyskinesia.  I talked with the parents about the possibility of the decrease in the Seroquel but at this point they would like to continue with that medication through further adjustment to the new staff members.  The patient did have a sleep study and is awaiting a CPAP machine as well and this may have a positive impact on her cognition mood and daytime tiredness.  The parents are extremely pleased with how well the patient is doing.      Current Medications: Please see chart. Medications personally reviewed.    Medication Compliance: yes    Side Effects to Medications:  no      Vitals:  Wt Readings from Last 3 Encounters:   04/04/16 (!) 313 lb (142 kg)   12/28/15 (!) 295 lb (133.8 kg)   08/24/15 (!) 278 lb (126.1 kg)     Temp Readings from Last 3  Encounters:   04/04/16 97.2  F (36.2  C) (Oral)   12/28/15 97.7  F (36.5  C) (Oral)   08/24/15 97.9  F (36.6  C) (Oral)     BP Readings from Last 3 Encounters:   04/04/16 130/84   12/28/15 136/84   08/24/15 124/79     Pulse Readings from Last 3 Encounters:   04/04/16 (!) 114   12/28/15 (!) 111   08/24/15 118         Mental Status Exam:   Patient was somewhat slow but overall a bit more alert with better eye contact and smiling a bit today.  No agitation.  No discomfort or shortness of breath.  Attention and concentration were fair.  She was able to participate but was somewhat vague.Mood was not depressed or anxious.  Affect was a bit blunted better range.  Speech showed her to be soft spoken but appropriate.  Limited spontaneity.  Answers were consistent but slightly vague.  No pressured or rambling quality.  Thought content does not show any hallucinations or delusions.  No suicidal or homicidal ideation.  Thought formation does not show the patient to be loose.  Insight, judgment and memory are all baseline impaired and unchanged.  Fund of knowledge is baseline.    Diagnosis managed and treated at today's visit :    Axis I: Mood disorder, NOS   Cognitive disorder NOS   History of atypical psychosis    Axis II: Developmental delay by history    Axis III: Please see initial psychiatric consultation note      Plan:  Medication Adjustment:  I will make no changes at this time.    Other:   Patient will return in 3-4 months for med check.  The staff and patient agree to call or return sooner questions concerns or problems arise.    Continue with the support of the clinic, reassurance, and redirection. Staff monitoring and ongoing assessments per team plan. Current psychotropic medication appears to represent the minimum effective dosage and appears medically necessary. We will continue to monitor and reassess. This team will utilize appropriate emergency services if necessary. I will make myself available if concerns  or problems arise.    Marito Matt

## 2021-06-12 NOTE — PROGRESS NOTES
Outpatient Followup Psychiatric Evaluation    Pertinent History: Patient presents for the purposes of medication management.  The patient is been doing relatively well on the current medication regime.  She has a history of developmental delay.  She's had a history of mood instability and behavioral dyscontrol.     I saw the patient in October 2018.  She was doing quite well at that time and there were no medication changes.    I saw the patient in January 2019.  At that time the patient was doing quite well with no complaints.  She was looking into doing volunteer work.  We did not make any medication changes at that time.    I saw the patient in August 2019.  She was doing relatively well but still with occasional verbal outbursts when she was redirected.  She was overall however stable and the staff was able to manage her.  We did not make any psychotropic medication changes at that time.    I had a phone conversation with the patient July 6 of 2020.  This was during the coronavirus lockdown.  The patient was able to participate adequately and I had an opportunity to speak with the patient's staff member as well.  The patient's parents were aware of the phone call and told the staff they had no concerns or complaints and they chose not to participate in the interview as they stated things were going well.     I saw the patient in July 2020. I saw her for a phone visit and she was doing fairly well at that time. We did not make any medication changes. She was tolerating the medication.    Current Symptoms:    I had an opportunity to interview the patient by phone today. I also spoke with the patient staff. All reported the patient is doing quite well. She just was turned from [place] where she had spent the last three weeks. She told me her mood was good. She denied having any depression or anxiety.  No desire to be dead or thoughts of suicide. She denied having any hallucinations or delusions. She reports no  behavioral outbursts or concerns. She stated she was tolerating the medication and denied having any side effects. She reported no new medical diagnosis or concerns. She was eating well and sleeping well. Both she and the staff are requesting no change in medications and they had no other questions or concerns.      Current Medications: Please see chart. Medications personally reviewed.    Medication Compliance: yes    Side Effects to Medications:  no      Vitals:  Wt Readings from Last 3 Encounters:   08/19/20 (!) 291 lb (132 kg)   02/28/20 (!) 292 lb (132.5 kg)   08/19/19 (!) 292 lb (132.5 kg)     Temp Readings from Last 3 Encounters:   08/19/20 98.2  F (36.8  C) (Oral)   10/16/17 97.7  F (36.5  C) (Oral)   04/04/16 97.2  F (36.2  C) (Oral)     BP Readings from Last 3 Encounters:   08/19/20 111/82   02/28/20 115/74   08/19/19 125/81     Pulse Readings from Last 3 Encounters:   08/19/20 (!) 122   02/28/20 94   08/19/19 (!) 119         Mental Status Exam:   Appearance:  The patient and staff were interviewed by phone.  Behavior:  No reports of any behavioral concerns by staff. The patient herself was calm and cooperative and pleasant during the interview.  Speech:  Not pressured or rambling. Answers were consistent and appropriate.  Mood/Affect:  No evidence of any depression or agitation. No irritability. Perhaps someone blunted in her affect.  Thought Content:  No reports of any psychosis. No evidence of any psychosis.  Suicidal or Homicidal Thoughts: Patient denied having any suicidal or homicidal ideation..  Thought Process/Formulation:  Tracking well. No evidence of any disorganized thoughts. No racing thoughts. Somewhat concrete.  Associations:  Fair. A bit vague at times.  Fund of Knowledge:  Adequate. No obvious recent change.  Attention/Concentration:  Adequate and baseline. Able to attend but needing some structure and prompts.  Insight:  Grossly unchanged.  Continues somewhat impaired  Judgement:  Grossly  unchanged.  Continues somewhat impaired  Memory: Adequate.  Needing prompts and structure.    Motor Status:  No recent change reported. No current tremor.   Orientation: No reports of any recent change.   She appears oriented    Diagnosis managed and treated at today's visit :    Psychosis, NOS  Major depressive disorder, recurrent, chronic, moderate, possibly with psychotic features  Intellectual disability, mild     I interviewed the patient for 17 minutes today.    Plan:  Medication Adjustment:   I will make no medication changes at this time.    Other:    We will continue with the current level supervision and support.      Continue with the support of the clinic, reassurance, and redirection. Staff monitoring and ongoing assessments per team plan. Current psychotropic medication appears to represent the minimum effective dosage and appears medically necessary. We will continue to monitor and reassess. This team will utilize appropriate emergency services if necessary. I will make myself available if concerns or problems arise.    Marito Matt MD

## 2021-06-12 NOTE — TELEPHONE ENCOUNTER
Writer tried several time to contact patient for check-in for today's appointment.      Unable to reach Patient did leave message asking them to call and schedule again.  Also scheduling number was given in messages.

## 2021-06-12 NOTE — TELEPHONE ENCOUNTER
[facility] please contact [facility]'s [facility] ([phone]) to make follow-up appt with Dr Matt.    Date of Last Office Visit: 7/6/2020  Date of Next Office Visit: none scheduled  No shows since last visit: 10/14/2020  Cancellations since last visit: none  ED visits since last visit:  none  Medication Quetiapine 200 mg date last ordered: 7/6/20  Qty: 30  Refills: 3  Lapse in therapy greater than 7 days: no  Medication refill request verified as identical to current order: yes  Result of Last DAM, VPA, Li+ Level, CBC, or Carbamazepine Level (at or since last visit): N/A     [] Medication refilled per [facility] M-1.   [x] Medication unable to be refilled by RN due to criteria not met as indicated below:     []Eligibility - not seen in last year    [x]Supervision - no future appointment    []Compliance     []Verification - order discrepancy    []Controlled Medication    []Medication not included in RN Protocol    [x]90 - day supply request    []Other   Current Medication list:    acetaminophen (TYLENOL) 325 MG tablet Take 650 mg by mouth every 4 (four) hours as needed for pain.   calcium, as carbonate, (TUMS) 200 mg calcium (500 mg) chewable tablet Chew 1-2 tablets as needed for heartburn.   divalproex (DEPAKOTE ER) 500 MG 24 hour tablet Take 2 tablets (1,000 mg total) by mouth 2 (two) times a day.   loratadine (CLARITIN) 10 mg tablet Take 10 mg by mouth daily.    lorazepam (ATIVAN ORAL) Take 10 mg by mouth every 6 (six) hours as needed.   medroxyPROGESTERone (DEPO-PROVERA) 400 mg/mL Soln 150 mg.   omeprazole (PRILOSEC) 20 MG capsule Take 20 mg by mouth 2 (two) times a day.   QUEtiapine (SEROQUEL) 200 MG tablet Take 1 tablet (200 mg total) by mouth at bedtime.   venlafaxine (EFFEXOR-XR) 150 MG 24 hr capsule 2 CAPSULES (300MG) BY MOUTH DAILY       Medication Plan of   acetaminophen (TYLENOL) 325 MG tablet Take 650 mg by mouth every 4 (four) hours as needed for pain.   calcium, as carbonate, (TUMS)  200 mg calcium (500 mg) chewable tablet Chew 1-2 tablets as needed for heartburn.   divalproex (DEPAKOTE ER) 500 MG 24 hour tablet Take 2 tablets (1,000 mg total) by mouth 2 (two) times a day.   loratadine (CLARITIN) 10 mg tablet Take 10 mg by mouth daily.    lorazepam (ATIVAN ORAL) Take 10 mg by mouth every 6 (six) hours as needed.   medroxyPROGESTERone (DEPO-PROVERA) 400 mg/mL Soln 150 mg.   omeprazole (PRILOSEC) 20 MG capsule Take 20 mg by mouth 2 (two) times a day.   QUEtiapine (SEROQUEL) 200 MG tablet Take 1 tablet (200 mg total) by mouth at bedtime.   venlafaxine (EFFEXOR-XR) 150 MG 24 hr capsule 2 CAPSULES (300MG) BY MOUTH DAILY     Care at last office visit with MD/CNP:  Plan:  Medication Adjustment:  We will continue with the current treatment plan.

## 2021-06-12 NOTE — PROGRESS NOTES
This video/telephone visit will be conducted via a call between you and your physician/provider. We have found that certain health care needs can be provided without the need for an in-person physical exam. This service lets us provide the care you need with a video /telephone conversation. If a prescription is necessary we can send it directly to your pharmacy. If lab work is needed we can place an order for that and you can then stop by our lab to have the test done at a later time.    Just as we bill insurance for in-person visits, we also bill insurance for video/telephone visits. If you have questions about your insurance coverage, we recommend that you speak with your insurance company.    Patient has given verbal consent for video/Telephone visit? Yes  Patient would like telephone visit, please call:  [phone]  WILLIAM/LUIS BAER CMA    Patient verified allergies, medications and pharmacy via phone. Patient states she is ready for visit.    Unable to review [place] , awaiting access from provider.

## 2021-06-13 NOTE — PROGRESS NOTES
Pt states she is doing well. Pt and staff member from [facility] states pt is doing very well. No complaints or concerns.

## 2021-06-13 NOTE — PROGRESS NOTES
Outpatient Followup Psychiatric Evaluation      Pertinent History: Patient presents for the purposes of medication management.  The patient is been doing relatively well on the current medication regime.  She has a history of developmental delay.  She's had a history of mood instability and behavioral dyscontrol.   Recently has been doing quite well.  No recent medication changes.    Current Symptoms:   Patient staff report patient continues to do quite well.  There was no specific concerns or complaints.  No change in cognition.  Patient's mood has been good.  She denies having any depression.  No feelings of hopelessness or worthlessness or guilt.  She states she sleeping well.  She now uses a CPAP at night and is sleeping better.  She denies having any psychosis.  No suicidality.  She denies having any medical concerns.  No side effects to the medication.  The staff agree that the patient is doing very well at this time.  No new issues or concerns.  Did discuss the possibility of a medication reductions of both request to continue with the medications.  In the past with lower doses of medication she has decompensated.  Side effects to the medications.  Risks and benefits were again discussed.      Current Medications: Please see chart. Medications personally reviewed.    Medication Compliance: yes    Side Effects to Medications:  no      Vitals:  Wt Readings from Last 3 Encounters:   10/16/17 (!) 292 lb (132.5 kg)   06/12/17 (!) 295 lb (133.8 kg)   04/04/16 (!) 313 lb (142 kg)     Temp Readings from Last 3 Encounters:   10/16/17 97.7  F (36.5  C) (Oral)   04/04/16 97.2  F (36.2  C) (Oral)   12/28/15 97.7  F (36.5  C) (Oral)     BP Readings from Last 3 Encounters:   10/16/17 119/77   06/12/17 106/79   04/04/16 130/84     Pulse Readings from Last 3 Encounters:   10/16/17 99   06/12/17 (!) 103   04/04/16 (!) 114         Mental Status Exam:   Patient appears bright.  Good eye contact.  Participating and smiling.  No  agitation or distress.  No evidence of any pain or shortness of breath.  Speech is appropriate in content informant.  Somewhat monotone and slightly vague.  Answers were consistent.  She was able to dialogue.  Not pressured or rambling.  Attention and concentration were fair.  No racing thoughts.  Mood was not depressed or anxious.  Affect was a bit blunted but no lability.  Thought content does not show any obvious psychosis.  No suicidal or homicidal ideation.  Thought formation does not show the patient to be loose.  Insight, judgment and memory are all grossly at baseline.  Fund of knowledge is at baseline.    Diagnosis managed and treated at today's visit :    Psychosis, NOS  Major depressive disorder, recurrent, chronic, moderate, possibly with psychotic features  Intellectual disability, mild      Plan:  Medication Adjustment:  I will make no changes at this time.    Other:   Patient will return in 4 months for med check.  The staff and patient agree to call or return sooner questions concerns or problems arise.    Continue with the support of the clinic, reassurance, and redirection. Staff monitoring and ongoing assessments per team plan. Current psychotropic medication appears to represent the minimum effective dosage and appears medically necessary. We will continue to monitor and reassess. This team will utilize appropriate emergency services if necessary. I will make myself available if concerns or problems arise.    Marito Matt MD

## 2021-06-13 NOTE — PROGRESS NOTES
Correct pharmacy verified with patient and confirmed in snapshot? [x] yes []no    Medications Phoned  to Pharmacy [] yes [x]no  Name of Pharmacist:  List Medications, including dose, quantity and instructions      Medication Prescriptions given to patient   [] yes  [x] no   List the name of the drug the prescription was written for.      Medications ordered this visit were e-scribed.  Verified by order class [] yes  [x] no      Medication changes or discontinuations were communicated to patient's pharmacy:  [] yes  [x] no  Pharmacist Spoke With:     UA collected [] yes  [x] no    [place] Prescription Monitoring Program Reviewed? [x] yes  [] no    Referrals/Labs were made to:     Completed Charge Capture?  [x] yes  [] no    Future appointment was made: [x] yes  [] no  2/19/18  Dictation completed at time of chart check: [x] yes  [] no    I have checked the documentation for today s encounters and the above information has been reviewed and completed.

## 2021-06-14 NOTE — PROGRESS NOTES
________________________________________  Medications Phoned  to Pharmacy [] yes [x]no  Name of Pharmacist:  List Medications, including dose, quantity and instructions    Medications ordered this visit were e-scribed.  Verified by order class [x] yes  [] no   Depakote  mg; Effexor-XR  150 mg; Seroquel 200 mg   Medication changes or discontinuations were communicated to patient's pharmacy: [x] yes  [] no   [facility] dc'd Seroquel 200 mg; Effexor- mg; Depakote  mg      Dictation completed at time of chart check: [x] yes  [] no    I have checked the documentation for today s encounters and the above information has been reviewed and completed.

## 2021-06-14 NOTE — PATIENT INSTRUCTIONS - HE
Patient is doing relatively well and tolerating the medication.  She has had some recent outburst related to new staff but the staff are requesting no medication changes at this time and will continue to monitor.  The patient will return to this clinic in 3 months for medication check.  The staff agreed to call before then with any questions or concerns.

## 2021-06-14 NOTE — PROGRESS NOTES
Outpatient Followup Psychiatric Evaluation    Pertinent History: Patient presents for the purposes of medication management.  She has a history of developmental delay.  She's had a history of mood instability and behavioral dyscontrol.     I saw the patient in October 2018.  She was doing quite well at that time and there were no medication changes.    I saw the patient in January 2019.  At that time the patient was doing quite well with no complaints.  She was looking into doing volunteer work.  We did not make any medication changes at that time.    I saw the patient in August 2019.  She was doing relatively well but still with occasional verbal outbursts when she was redirected.  She was overall however stable and the staff was able to manage her.  We did not make any psychotropic medication changes at that time.    I had a phone conversation with the patient July 6 of 2020.  This was during the coronavirus lockdown.  The patient was able to participate adequately and I had an opportunity to speak with the patient's staff member as well.  The patient's parents were aware of the phone call and told the staff they had no concerns or complaints and they chose not to participate in the interview as they stated things were going well.     I saw the patient in July 2020. I saw her for a phone visit and she was doing fairly well at that time. We did not make any medication changes. She was tolerating the medication.    The patient in November 2021.  She was doing quite well after just having returned from [place].  We did not make any medication changes at that time.    Current Symptoms:   I had an opportunity to interview the patient today as well as staff and the .  They report that the patient has had some conflict with some new staff.  The new staff are fairly rigid on certain rules and on cooking items.  They are working through that and things are getting better.  The staff did not feel that should be  addressed by a medication change.    The patient initially told me everything was fine but when I asked her about the news after that extract she admitted she is gotten angry states better and she not feel we need to make and keep she was being adequately listened to    The patient denies having any hallucinations or delusions.  No thoughts of suicide.  She denies having any psychotic symptoms.  She reports she is sleeping well and eating well.  There is been no new medical issues or concerns.  She denies side effects to the medication and does not want any medication changes at this time.      Current Medications: Please see chart. Medications personally reviewed.    Medication Compliance: yes    Side Effects to Medications:  no      Vitals:  Wt Readings from Last 3 Encounters:   08/19/20 (!) 291 lb (132 kg)   02/28/20 (!) 292 lb (132.5 kg)   08/19/19 (!) 292 lb (132.5 kg)     Temp Readings from Last 3 Encounters:   08/19/20 98.2  F (36.8  C) (Oral)   10/16/17 97.7  F (36.5  C) (Oral)   04/04/16 97.2  F (36.2  C) (Oral)     BP Readings from Last 3 Encounters:   08/19/20 111/82   02/28/20 115/74   08/19/19 125/81     Pulse Readings from Last 3 Encounters:   08/19/20 (!) 122   02/28/20 94   08/19/19 (!) 119         Mental Status Exam:   Appearance:  The patient and staff were interviewed by phone.  The patient was cooperative and seemed compliant.  Behavior: Some recent irritability with conflict with new staff but otherwise she is been doing well and for the most part is redirectable.  Speech: Somewhat monotone and slow.  Somewhat vague.  This appeared baseline.  No pressured or rambling quality.  Mood/Affect: Baseline flat but not obviously depressed.  No titi.  No irritability with me.  Thought Content:  No reports of any psychosis. No evidence of any psychosis.  The patient denies such.  Suicidal or Homicidal Thoughts: Patient denied having any suicidal or homicidal ideation..  Thought Process/Formulation:   Tracking well.  She is baseline slow.  No evidence of any disorganized thoughts. No racing thoughts. Somewhat concrete.  Associations:  Fair. A bit vague at times.  Fund of Knowledge:  Adequate. No obvious recent change.  Attention/Concentration: She again is somewhat concrete and slow but able to participate.  Limited initiation.  Insight:  Grossly unchanged.  Continues somewhat impaired  Judgement:  Grossly unchanged.  Continues somewhat impaired  Memory: Adequate.  Needing prompts and structure.    Motor Status:  No recent change reported. No current tremor.   Orientation: No reports of any recent change.   She appears oriented    Diagnosis managed and treated at today's visit :    Psychosis, NOS  Major depressive disorder, recurrent, chronic, moderate, possibly with psychotic features  Intellectual disability, mild     I interviewed the patient for 22 minutes today.    Plan:  Medication Adjustment:   I will make no medication changes at this time.    Other:    We will continue with the current level supervision and support.      Continue with the support of the clinic, reassurance, and redirection. Staff monitoring and ongoing assessments per team plan. Current psychotropic medication appears to represent the minimum effective dosage and appears medically necessary. We will continue to monitor and reassess. This team will utilize appropriate emergency services if necessary. I will make myself available if concerns or problems arise.    Marito Matt MD

## 2021-06-15 NOTE — TELEPHONE ENCOUNTER
"HPI    SUBJECTIVE:   Bean Huynh is a 34 year old male who presents to clinic today for the following health issues:    Rash  Onset: x5 days    Description:   Location: Groin  Character: purplish in color  Itching (Pruritis): YES- mild at night    Progression of Symptoms:  Worsening with work    Accompanying Signs & Symptoms:  Fever: no   Body aches or joint pain: no   Sore throat symptoms: no   Recent cold symptoms: no     History:   Previous similar rash: YES- was treated with nystatin powder in 2016    Precipitating factors:   Exposure to similar rash: no   New exposures: None   Recent travel: no     Alleviating factors:  None    Therapies Tried and outcome: Switched to cotton underwear    Rash in groin.  Work is pretty hot, sweaty.  In the past told this due to not staying dry.  Has had periodically for years.  Does tend to come and go.  Rx powder will clear it up, but it always come back.  Current episode is not as bad as previous, but did come in earlier than previously.  Rash is \"irritating\", tender, chafes more easily then when not present.  Often uses Gold [place] powder      Review of Systems   Constitutional: Negative.    Genitourinary: Negative.    Skin: Positive for itching and rash.         Physical Exam   Constitutional: He is oriented to person, place, and time and well-developed, well-nourished, and in no distress.   Neurological: He is alert and oriented to person, place, and time.   Skin: Skin is warm and dry.   Candidal rash in L inguinal fold.  Satellite lesions, some superficial skin breakdown.   Vitals reviewed.      (B37.89) Candida rash of groin  (primary encounter diagnosis)  Comment: will treat today with oral. Cream to have on hand for prn in the future  Plan: ketoconazole (NIZORAL) 200 MG tablet,         nystatin-triamcinolone (MYCOLOG) ointment            (Z23) Need for prophylactic vaccination with tetanus-diphtheria (TD)  Comment:   Plan: TDAP VACCINE (ADACEL)            (Z23) " It does not appear that you are asking me to make any changes.  I am fine continuing with the medication as ordered.  Please me know if there is anything else you need.  Thank you.   Need for prophylactic vaccination and inoculation against influenza  Comment:   Plan: FLU VACCINE, SPLIT VIRUS, IM (QUADRIVALENT)         [67442]- >3 YRS, Vaccine Administration,         Initial [10053]              RTC in     Song Phillips MD        Injectable Influenza Immunization Documentation    1.  Is the person to be vaccinated sick today?   No    2. Does the person to be vaccinated have an allergy to a component   of the vaccine?   No  Egg Allergy Algorithm Link    3. Has the person to be vaccinated ever had a serious reaction   to influenza vaccine in the past?   No    4. Has the person to be vaccinated ever had Guillain-Barré syndrome?   No    Form completed by Nadia Macias CMA (AAMA)

## 2021-06-15 NOTE — TELEPHONE ENCOUNTER
DadJoel, requests call-back from nursing team @ [phone]    Mely reports pt's residence has been having a problem getting her seroquel rx refilled.  States pharmacy will not refill it and pt has not been able to take it for the past week.

## 2021-06-15 NOTE — TELEPHONE ENCOUNTER
Spoke to pharmacy. They indicate that the  informed them that the seroquel script for 200 mg at bedtime is incorrect. They state that the pt is typically on seroquel 100 mg twice daily. Unclear in notes which is correct. Routing to provider to clarify/and order.

## 2021-06-15 NOTE — TELEPHONE ENCOUNTER
Group home is calling back again in re to dosage. We reviewed the last year of visits. Other:   I am going to decrease the a.m. Seroquel to 100 mg.  We will continue with 200 mg of Seroquel at night.  The team agrees to call in a month or so with an update and we will consider a future reduction at that time.  Please call group home with this to confirm any changes.[phone]

## 2021-06-15 NOTE — TELEPHONE ENCOUNTER
Reason for Call:  Other call back      Detailed comments: rcclemente call from: Maryann @   [facility]   [phone]   Pharmacy Address and Hours    Address Hours   [address] [facility] [address]      Re: QUEtiapine (SEROQUEL) 200 MG tablet  They are seeking clarification of order want to know if the 100 to 200 @ hs was intended     Phone Number Patient can be reached at: Other phone number:  [phone]    Best Time: any    Can we leave a detailed message on this number?: Yes    Call taken on 2/26/2021 at 2:12 PM by Fabio Kilpatrick

## 2021-06-15 NOTE — TELEPHONE ENCOUNTER
Spoke to Maryann at [facility] and confirmed that the pt should be taking 200 mg of seroquel at bedtime.

## 2021-06-15 NOTE — TELEPHONE ENCOUNTER
Spoke to Claudia from the group home and let her know we have never received a refill request for Seroquel 100 mg in the morning.  She was informed that a refill request will be sent to Dr. Matt.

## 2021-06-15 NOTE — TELEPHONE ENCOUNTER
Date of Last Office Visit: 1-27-21  Date of Next Office Visit: 4-21-21  No shows since last visit: 0  Cancellations since last visit: 0    Medication requested: seroquel 100 mg Date last  ordered: 2-28-21 Qty: 60 Refills: 5   It was included in the seroquel 200 mg script, but then discontinued that went to Johanna Canales from the group home insists patient should still be on the Seroquel 100 mg in am.      Lapse in medication adherence greater than 5 days?: no    Medication refill request verified as identical to current order?: yes  Result of Last DAM, VPA, Li+ Level, CBC, or Carbamazepine Level (at or since last visit): N/A    []Medication refilled per  Medication Refill in Ambulatory Care  policy.  [x]Medication unable to be refilled by RN due to criteria not met as indicated below:    []Eligibility - not seen in the last year   []Supervision - no future appointment   []Compliance - no shows, cancellations or lapse in therapy   []Verification - order discrepancy   []Controlled medication   []Medication not included in policy   []90-day supply request   [x]Other

## 2021-06-15 NOTE — TELEPHONE ENCOUNTER
I last saw the patient a month ago.  We did not make any medication changes.  The records do show that she was on Seroquel 200 mg at at bedtime.  I do not have any other information.  She should continue on the same dosage of the Seroquel that she was on before.  The chart says that was 200 mg and I do not have any reason to doubt that.

## 2021-06-15 NOTE — TELEPHONE ENCOUNTER
"Reason for call:  Medication If this is a refill request, has the caller requested the refill from the pharmacy already?   Yes  Will the patient be using a [facility]? No  Name of the pharmacy and phone number for the current request: [facility] [phone]    Name of the medication requested: Quetiapine 100's    Other request: If the morning 100's dose has been dicontinued please advise patient/Patient's   Phone number to reach patient:  [phone]      Best Time:  ASAP    Can we leave a detailed message on this number? Yes    Patient's  called stating that her Pharmacy will not fill her Quetiapine 100\"s because she \"is not on them anymore.\"  Caller said she was a part of patients last appoint with Dr. Matt and doesn't recall any medication changes.   (Ally) request a call to verify if it has been DC'd.      "

## 2021-06-15 NOTE — TELEPHONE ENCOUNTER
Patient's  is calling in regards to her medication:    QUEtiapine (SEROQUEL)       She advised that the 100mg tab has not been sent to the new pharmacy    NEW PHARMACY:  [facility], [place] - [address] [place] [address] Drive   Phone:  [phone]  Fax:  [phone]

## 2021-06-16 NOTE — PATIENT INSTRUCTIONS - HE
Patient is doing well and tolerating the medication.  We will continue with current treatment plan.  The patient will return to clinic in 3 months for medication check.  The staff report the patient continues with laboratory work through her primary care physician and this is been reassuring.

## 2021-06-16 NOTE — PROGRESS NOTES
Outpatient Followup Psychiatric Evaluation    Pertinent History: Patient presents for the purposes of medication management.  She has a history of developmental delay.  She's had a history of mood instability and behavioral dyscontrol.     I saw the patient in October 2018.  She was doing quite well at that time and there were no medication changes.    I saw the patient in January 2019.  At that time the patient was doing quite well with no complaints.  She was looking into doing volunteer work.  We did not make any medication changes at that time.    I saw the patient in August 2019.  She was doing relatively well but still with occasional verbal outbursts when she was redirected.  She was overall however stable and the staff was able to manage her.  We did not make any psychotropic medication changes at that time.    I had a phone conversation with the patient July 6 of 2020.  This was during the coronavirus lockdown.  The patient was able to participate adequately and I had an opportunity to speak with the patient's staff member as well.  The patient's parents were aware of the phone call and told the staff they had no concerns or complaints and they chose not to participate in the interview as they stated things were going well.     I saw the patient in July 2020. I saw her for a phone visit and she was doing fairly well at that time. We did not make any medication changes. She was tolerating the medication.    The patient in November 2021.  She was doing quite well after just having returned from [place].  We did not make any medication changes at that time.    Saw the patient in January 2021.  She had had some conflict with new staff and was frustrated with some of the rules of the house but the staff did not feel a medication change was warranted.  We continued with the treatment plan.    Current Symptoms:   Had an opportunity to interview the patient and the staff member Nicol who was present.  Both report the  patient is doing very well and they had no concerns or complaints.  The patient denies having any depression or anxiety.  There is been no desire to be dead or thoughts of suicide.  She states she is sleeping well and eating well.  No psychotic symptoms.  She states work is been going well but then she is on a hiatus due to the fact that somebody at work developed coronavirus.  She likes having the time off and on the more free time.  She denies having any new allergies or new medical concerns.  She states she is tolerating the medication.  The staff agrees that the patient is doing very well and they do not want any medication changes.  The staff reported the patient continues to have her labs done through her primary care clinic and they have all been reassuring.  They are requesting no changes at this time.      Current Medications: Please see chart. Medications personally reviewed.    Medication Compliance: yes    Side Effects to Medications:  no      Vitals:  Wt Readings from Last 3 Encounters:   08/19/20 (!) 291 lb (132 kg)   02/28/20 (!) 292 lb (132.5 kg)   08/19/19 (!) 292 lb (132.5 kg)     Temp Readings from Last 3 Encounters:   08/19/20 98.2  F (36.8  C) (Oral)   10/16/17 97.7  F (36.5  C) (Oral)   04/04/16 97.2  F (36.2  C) (Oral)     BP Readings from Last 3 Encounters:   08/19/20 111/82   02/28/20 115/74   08/19/19 125/81     Pulse Readings from Last 3 Encounters:   08/19/20 (!) 122   02/28/20 94   08/19/19 (!) 119         Mental Status Exam:   Appearance: Staff and patient were interviewed by phone.  The patient was cooperative and pleasant.  No distress.  She was baseline slow and did need occasional prompts.  Behavior: Staff report there is been no behavioral problems.  During the phone interview the patient was polite and calm with limited initiation.  Speech: Somewhat monotone and slow.  Somewhat vague.  This appeared baseline.  No pressured or rambling quality.  Answers were  appropriate.  Mood/Affect: Somewhat flat and slow but this is fairly typical.  No anxiety or irritability.  Thought Content:  No reports of any psychosis. No evidence of any psychosis.   Suicidal or Homicidal Thoughts: Patient denied having any suicidal or homicidal ideation.  Staff report the patient has been doing well regarding her mood..  Thought Process/Formulation: Tracking adequately.  She is baseline slow.  No evidence of any disorganized thoughts. No racing thoughts. Somewhat concrete.  Associations:  Fair. A bit vague at times.  No obvious recent change.  Fund of Knowledge:  Adequate. No obvious recent change.  Attention/Concentration: She again is somewhat concrete and slow but able to participate.  Limited initiation.  Insight:  Grossly unchanged.  Continues somewhat impaired  Judgement:  Grossly unchanged.  Continues somewhat impaired  Memory: Adequate.  Needing prompts and structure.    Motor Status:  No recent change reported. No current tremor.   Orientation: No reports of any recent change.   She appears oriented    Diagnosis managed and treated at today's visit :    Psychosis, NOS  Major depressive disorder, recurrent, chronic, moderate, possibly with psychotic features  Intellectual disability, mild    Patient and staff interview, chart review and documentation was 24 minutes.    Plan:  Medication Adjustment:  We will continue with the current medications.    Other:    We will continue with the current level supervision and support.      Continue with the support of the clinic, reassurance, and redirection. Staff monitoring and ongoing assessments per team plan. Current psychotropic medication appears to represent the minimum effective dosage and appears medically necessary. We will continue to monitor and reassess. This team will utilize appropriate emergency services if necessary. I will make myself available if concerns or problems arise.    Marito Matt MD

## 2021-06-16 NOTE — TELEPHONE ENCOUNTER
"Date of Last Office Visit: 1/27/21  Date of Next Office Visit: 4/21/21  No shows since last visit: 0  Cancellations since last visit: 0    Medication requested: Seroquel 100 mg Date last ordered: 3/9/21 Qty: 30 Refills: 1   Note form pharmacy: \" PROACTIVE REFILL REQUEST FOR CYCLE FILL!\"     Disp Refills Start End YURIY   QUEtiapine (SEROQUEL) 100 MG tablet 30 tablet 1 3/9/2021  No   Sig - Route: Take 1 tablet (100 mg total) by mouth every morning. - Oral       Review of [place] ?: NA    Lapse in medication adherence greater than 5 days?: No  If yes, call patient and gather details: NA  Medication refill request verified as identical to current order?: yes except asking for 28 days and 11 refills  Result of Last DAM, VPA, Li+ Level, CBC, or Carbamazepine Level (at or since last visit): See labs ( ordered by outside provider\"    []Medication refilled per  Medication Refill in Ambulatory Care  policy.  [x]Medication unable to be refilled by RN due to criteria not met as indicated below:    []Eligibility - not seen in the last year   []Supervision - no future appointment   []Compliance - no shows, cancellations or lapse in therapy   []Verification - order discrepancy   []Controlled medication   []Medication not included in policy   []90-day supply request   [x]Other: Too early to fill. LPN is processing request    "

## 2021-06-16 NOTE — PROGRESS NOTES
This video/telephone visit will be conducted via a call between you and your physician/provider. We have found that certain health care needs can be provided without the need for an in-person physical exam. This service lets us provide the care you need with a video /telephone conversation. If a prescription is necessary we can send it directly to your pharmacy. If lab work is needed we can place an order for that and you can then stop by our lab to have the test done at a later time.    Just as we bill insurance for in-person visits, we also bill insurance for video/telephone visits. If you have questions about your insurance coverage, we recommend that you speak with your insurance company.    Patient has given verbal consent for Telephone visit? Yes   Patient would like telephone visit please call: [phone]  WILLIAM/LUIS ROWE CMA   Per Staff no new concerns.   Medication refills are not needed today.     Patient verified allergies, medications and pharmacy via phone.  Patient states she is ready for visit.  [place]  to be reviewed by provider.

## 2021-06-17 ENCOUNTER — COMMUNICATION - HEALTHEAST (OUTPATIENT)
Dept: BEHAVIORAL HEALTH | Facility: CLINIC | Age: 46
End: 2021-06-17

## 2021-06-17 DIAGNOSIS — F06.30 MOOD DISORDER IN CONDITIONS CLASSIFIED ELSEWHERE: ICD-10-CM

## 2021-06-17 DIAGNOSIS — F41.9 ANXIETY: ICD-10-CM

## 2021-06-18 RX ORDER — DIVALPROEX SODIUM 500 MG/1
TABLET, EXTENDED RELEASE ORAL
Qty: 112 TABLET | Refills: 12 | Status: SHIPPED | OUTPATIENT
Start: 2021-06-18 | End: 2023-05-02

## 2021-06-18 RX ORDER — VENLAFAXINE HYDROCHLORIDE 150 MG/1
CAPSULE, EXTENDED RELEASE ORAL
Qty: 56 CAPSULE | Refills: 12 | Status: SHIPPED | OUTPATIENT
Start: 2021-06-18 | End: 2022-06-23

## 2021-06-23 NOTE — TELEPHONE ENCOUNTER
Writer left message for Yudy /  that brought Paige to her appointment with Dr. Matt to call [facility] with [facility] address information. (to mail Consent for Service form to).

## 2021-06-23 NOTE — PROGRESS NOTES
Patient here today for follow up of medication management. States depression good, anxiety good. States sleeps about 8 or more hours a night, uses cpap.

## 2021-06-23 NOTE — TELEPHONE ENCOUNTER
Date of Last Office Visit: 10/16/17  Date of Next Office Visit: None scheduled, group home called to make appt,  calling back  No shows since last visit: 2/19/18  Cancellations since last visit: none  ED visits since last visit:  none  Medication Venlafaxine 150 mg  date last ordered: 2/27/18 Qty: 60  Refills: 10  Medication Quetiapine 200 mg date last ordered: 2/27/18  Qty: 60  Refills: 10  Lapse in therapy greater than 7 days: no  Medication refill request verified as identical to current order: yes  Result of Last DAM, VPA, Li+ Level, CBC, or Carbamazepine Level (at or since last visit): N/A     [] Medication refilled per [facility] M-1.   [x] Medication unable to be refilled by RN due to criteria not met as indicated below:     [x]Eligibility - not seen in last year    [x]Supervision - no future appointment    []Compliance     []Verification - order discrepancy    []Controlled Medication    []Medication not included in RN Protocol    []90 - day supply request    []Other     Current Medication list:    acetaminophen (TYLENOL) 325 MG tablet Take 650 mg by mouth every 4 (four) hours as needed for pain.   calcium, as carbonate, (TUMS) 200 mg calcium (500 mg) chewable tablet Chew 1-2 tablets as needed for heartburn.   divalproex (DEPAKOTE) 500 MG 24 hr tablet TAKE 2 TABLETS (1,000 MG TOTAL) BY MOUTH 2 (TWO) TIMES A DAY.   loratadine (CLARITIN) 10 mg tablet Take 10 mg by mouth daily.    LORazepam (ATIVAN) 0.5 MG tablet Take 1 tablet (0.5 mg total) by mouth every 6 (six) hours as needed for anxiety.   medroxyPROGESTERone (DEPO-PROVERA) 400 mg/mL Soln 150 mg.   NAPROXEN ORAL Take 220 mg by mouth. One in am and one at bedtime as needed   omeprazole (PRILOSEC) 20 MG capsule Take 20 mg by mouth 2 (two) times a day.   polyethylene glycol (MIRALAX) 17 gram packet Take 17 g by mouth daily as needed.    QUEtiapine (SEROQUEL) 200 MG tablet TAKE 1 TAB (200 MG TOTAL) BY MOUTH 2 (TWO) TIMES A DAY.   venlafaxine (EFFEXOR-XR)  150 MG 24 hr capsule 2 CAPSULES (300MG) BY MOUTH DAILY       Medication Plan of Care at last office visit with MD/CNP:    Plan:  Medication Adjustment:  I will make no changes at this time.  Other:   Patient will return in 4 months for med check.  The staff and patient agree to call or return sooner questions concerns or problems arise.

## 2021-06-23 NOTE — PROGRESS NOTES
Correct pharmacy verified with patient and confirmed in snapshot? [x] yes []no    Charge captured ? [x] yes  [] no    Medications Phoned  to Pharmacy [] yes [x]no  Name of Pharmacist:  List Medications, including dose, quantity and instructions      Medication Prescriptions given to patient   [] yes  [x] no   List the name of the drug the prescription was written for.       Medications ordered this visit were e-scribed.  Verified by order class [x] yes  [] no depakote 500mg, seroquel 200mg, effexor 150mg    Medication changes or discontinuations were communicated to patient's pharmacy: [] yes  [x] no    UA collected [] yes  [x] no    [place] Prescription Monitoring Program Reviewed? [] yes  [x] no no access    Referrals were made to:  none    Future appointment was made: [x] yes  [] no    Dictation completed at time of chart check: [x] yes  [] no    I have checked the documentation for today s encounters and the above information has been reviewed and completed.

## 2021-06-23 NOTE — PROGRESS NOTES
Outpatient Followup Psychiatric Evaluation      Pertinent History: Patient presents for the purposes of medication management.  The patient is been doing relatively well on the current medication regime.  She has a history of developmental delay.  She's had a history of mood instability and behavioral dyscontrol.     I saw the patient in October 2018.  She was doing quite well at that time and there were no medication changes.    Current Symptoms:   I had an opportunity to review the records, review of the staff's paperwork, talk with the staff as well as interview the patient.  All report the patient is doing quite well.  No specific concerns or complaints today.  The patient denies having any significant depression.  She denies feeling hopeless or helpless no anxiety or panic.  No change in cognition.  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  She is looking to do some volunteer work.  She enjoys going to various activities outside of the house and that is going well too.  The patient denies having any desire to be dead or thoughts of suicide.  There is no psychosis.  No manic symptoms.  She is sleeping well.  She denies having any change in appetite.  No new medical concerns.  She continues to be followed for her Depakote through her primary care clinic.  She recently changed providers and likes the new provider as well.  She reports no side effects to the medication.  There is been no tremor.  No evidence of any tardive dyskinesia according to the patient, staff as well as on exam.  All would like to continue with the medications as ordered.    I did review and fill out the patient's staff's paperwork.      Current Medications: Please see chart. Medications personally reviewed.    Medication Compliance: yes    Side Effects to Medications:  no      Vitals:  Wt Readings from Last 3 Encounters:   01/28/19 (!) 290 lb (131.5 kg)   10/16/17 (!) 292 lb (132.5 kg)   06/12/17 (!) 295 lb (133.8 kg)      Temp Readings from Last 3 Encounters:   10/16/17 97.7  F (36.5  C) (Oral)   04/04/16 97.2  F (36.2  C) (Oral)   12/28/15 97.7  F (36.5  C) (Oral)     BP Readings from Last 3 Encounters:   01/28/19 125/77   10/16/17 119/77   06/12/17 106/79     Pulse Readings from Last 3 Encounters:   01/28/19 91   10/16/17 99   06/12/17 (!) 103         Mental Status Exam:   Appearance:  The patient was alert, comfortable and calm. No agitation. Not currently in any pain. No evidence of any shortness of breath.  Behavior:  The patient is calm, cooperative, with no agitation or obvious distress. The patient does participate. No restlessness. No reports of any recent behavioral dyscontrol.  Limited initiation but she participates and does not appear to be guarded.  No irritability.  Speech:  Sentence structure is intact.  Somewhat simple and concrete.  Somewhat monotone.  Appears baseline.  Able to dialogue. Answers are consistent and somewhat vague.  Not pressured. Not rambling.  Mood/Affect:  Patient appears alert.  Slightly slow and slightly restricted in affect.  No obvious depression or anxiety. No irritability. No lability.  Thought Content:  No evidence of any psychosis. No reports of any recent psychosis.  Suicidal or Homicidal Thoughts:  None apparent or reported. The patient denies any suicidal or homicidal ideation.   Thought Process/Formulation:  Able to track and follow.  The patient does need prompts and structure at times.  No racing thoughts.  Somewhat concrete.  Somewhat vague.  Associations:  Fair.  No recent change.  Able to process information.  Fund of Knowledge: Impaired.  No reports of any significant recent change.   Attention/Concentration: Needs prompts to attend. Concentration continues impaired.  Slow.  Somewhat concrete.  Not disorganized.  Insight:  Grossly unchanged.  Continues somewhat impaired  Judgement:  Grossly unchanged.  Continues somewhat impaired  Memory:  Grossly fair.  Needing prompts and  structure.    Motor Status:  No recent change reported. No current tremor.   Orientation: No reports of any recent change.    Diagnosis managed and treated at today's visit :    Psychosis, NOS  Major depressive disorder, recurrent, chronic, moderate, possibly with psychotic features  Intellectual disability, mild      Plan:  Medication Adjustment:  We will continue with the current treatment plan.    Other:   Patient will return in 6 months for med check.  The staff and patient agree to call or return sooner questions concerns or problems arise.    Continue with the support of the clinic, reassurance, and redirection. Staff monitoring and ongoing assessments per team plan. Current psychotropic medication appears to represent the minimum effective dosage and appears medically necessary. We will continue to monitor and reassess. This team will utilize appropriate emergency services if necessary. I will make myself available if concerns or problems arise.    Marito Matt MD

## 2021-06-26 NOTE — TELEPHONE ENCOUNTER
Date of Last Office Visit: 4/21/21  Date of Next Office Visit: 7/14/21  No shows since last visit: 0  Cancellations since last visit: 0    Medication requested: Both Date last ordered:1/27/21 Qty: 30 Refills: 5     Review of [place] ?: NA    Lapse in medication adherence greater than 5 days?: No  If yes, call patient and gather details: NA  Medication refill request verified as identical to current order?: yes  Result of Last DAM, VPA, Li+ Level, CBC, or Carbamazepine Level (at or since last visit): NA    []Medication refilled per  Medication Refill in Ambulatory Care  policy.  [x]Medication unable to be refilled by RN due to criteria not met as indicated below:    []Eligibility - not seen in the last year   []Supervision - no future appointment   []Compliance - no shows, cancellations or lapse in therapy   []Verification - order discrepancy   []Controlled medication   []Medication not included in policy   []90-day supply request   [x]Other : Requested too early              LPN is processing request

## 2021-06-28 NOTE — PROGRESS NOTES
Progress Notes by Shanika Otero CMA at 2/28/2020  9:15 AM     Author: Shanika Otero CMA Service: -- Author Type: Certified Medical Assistant    Filed: 2/28/2020  9:48 AM Encounter Date: 2/28/2020 Status: Signed    : Shanika Otero CMA (Certified Medical Assistant)       Pt is here for psychiatric follow up and medication management.  Parents are here today along with house staff.    Concerns about weight gain and sleepiness for 30-45 minutes after taking her Seroquel.      MN :

## 2021-06-30 NOTE — PROGRESS NOTES
Progress Notes by Shanika Otero CMA at 1/27/2021  3:00 PM     Author: Shanika Otero CMA Service: -- Author Type: Certified Medical Assistant    Filed: 1/27/2021  3:12 PM Encounter Date: 1/27/2021 Status: Signed    : Shanika Otero CMA (Certified Medical Assistant)       This video/telephone visit will be conducted via a call between you and your physician/provider. We have found that certain health care needs can be provided without the need for an in-person physical exam. This service lets us provide the care you need with a video /telephone conversation. If a prescription is necessary we can send it directly to your pharmacy. If lab work is needed we can place an order for that and you can then stop by our lab to have the test done at a later time.   Just as we bill insurance for in-person visits, we also bill insurance for video/telephone visits. If you have questions about your insurance coverage, we recommend that you speak with your insurance company.   Patient has given verbal consent for Telephone visit? Yes   Patient would like telephone visit please call: [phone]  WILLIAM/LUIS ROWE CMA   AVS-Fax to [phone]  Per Staff updated pharmacy in chart to [facility].   Pt is using scare tactics to get what she wants with new staff at times.   At times she is not redirectable and does go after staff.   Pt is over due for Valproic Acid level. Order is pended.    Patient verified allergies, medications and pharmacy via phone.  Patient states she  is ready for visit.  MN  was reviewed prior to seeing patient today. See below for embedded report.

## 2021-07-14 ENCOUNTER — VIRTUAL VISIT (OUTPATIENT)
Dept: BEHAVIORAL HEALTH | Facility: CLINIC | Age: 46
End: 2021-07-14
Payer: MEDICARE

## 2021-07-14 DIAGNOSIS — F06.30 MOOD DISORDER IN CONDITIONS CLASSIFIED ELSEWHERE: Primary | ICD-10-CM

## 2021-07-14 PROCEDURE — 99443 PR PHYSICIAN TELEPHONE EVALUATION 21-30 MIN: CPT | Mod: 95 | Performed by: PSYCHIATRY & NEUROLOGY

## 2021-07-14 NOTE — PROGRESS NOTES
Outpatient Followup Psychiatric Evaluation    Pertinent History: Patient presents for the purposes of medication management.  She has a history of developmental delay.  She's had a history of mood instability and behavioral dyscontrol.     I saw the patient in October 2018.  She was doing quite well at that time and there were no medication changes.    I saw the patient in January 2019.  At that time the patient was doing quite well with no complaints.  She was looking into doing volunteer work.  We did not make any medication changes at that time.    I saw the patient in August 2019.  She was doing relatively well but still with occasional verbal outbursts when she was redirected.  She was overall however stable and the staff was able to manage her.  We did not make any psychotropic medication changes at that time.    I had a phone conversation with the patient July 6 of 2020.  This was during the coronavirus lockdown.  The patient was able to participate adequately and I had an opportunity to speak with the patient's staff member as well.  The patient's parents were aware of the phone call and told the staff they had no concerns or complaints and they chose not to participate in the interview as they stated things were going well.     I saw the patient in July 2020. I saw her for a phone visit and she was doing fairly well at that time. We did not make any medication changes. She was tolerating the medication.    The patient in November 2021.  She was doing quite well after just having returned from [place].  We did not make any medication changes at that time.    Saw the patient in January 2021.  She had had some conflict with new staff and was frustrated with some of the rules of the house but the staff did not feel a medication change was warranted.  We continued with the treatment plan.    Saw the patient in April 2021.  She was doing well at that time and tolerating medication would not make any medication  changes.      Current Symptoms:   I had an opportunity to interview the patient as well as her father today.  Both reported the patient was doing quite well and they had no concerns or complaints.    The patient and her father Leroy both reported the patient was in a good mood.  There was no recent behavioral issues or concerns.  The patient denied having any significant depression or anxiety.  No hallucinations or delusions.  No desire to be dead or thoughts of suicide.  She reported she was sleeping well and eating well and denied having any change in cognition or concentration.    She reported that following the coronavirus things are starting to open up and she was doing more activities.  She stated she had no new medical diagnoses, no new allergies and no side effects to the medication.  She is noted no tremor and her father agreed.  Both wanted to continue with the current treatment plan he had no other questions.      Current Medications: Please see chart. Medications personally reviewed.    Medication Compliance: yes    Side Effects to Medications:  no      Vitals:  Wt Readings from Last 3 Encounters:   08/19/20 (!) 291 lb (132 kg)   02/28/20 (!) 292 lb (132.5 kg)   08/19/19 (!) 292 lb (132.5 kg)     Temp Readings from Last 3 Encounters:   08/19/20 98.2  F (36.8  C) (Oral)   10/16/17 97.7  F (36.5  C) (Oral)   04/04/16 97.2  F (36.2  C) (Oral)     BP Readings from Last 3 Encounters:   08/19/20 111/82   02/28/20 115/74   08/19/19 125/81     Pulse Readings from Last 3 Encounters:   08/19/20 (!) 122   02/28/20 94   08/19/19 (!) 119         Mental Status Exam:   Appearance: The patient and her father were interviewed by phone.  Behavior: No reports of any behavioral changes or behavioral difficulties.  Speech: Answers were appropriate but a bit vague.  This appeared baseline.  No pressured or rambling quality.  Mood/Affect: No lability or irritability.  No reports of any titi or significant  depression.  Thought Content:  No reports of any psychosis. No evidence of any psychosis.   Suicidal or Homicidal Thoughts: Patient denied having any suicidal or homicidal ideation.  Staff report the patient has been doing well regarding her mood..  Thought Process/Formulation: Baseline slow and concrete.  Not disorganized.  No racing.  Associations: Baseline vague.  Grossly adequate.  Fund of Knowledge:  Adequate. No obvious recent change.  Attention/Concentration: She again is somewhat concrete and slow but able to participate.  Limited initiation.  No recent change.  Insight:  Grossly unchanged.  Continues somewhat impaired  Judgement:  Grossly unchanged.  Continues somewhat impaired  Memory: Adequate.  Needing prompts and structure.    Motor Status:  No recent change reported. No current tremor.   Orientation: No reports of any recent change.   She appears oriented    Diagnosis managed and treated at today's visit :    Psychosis, NOS  Major depressive disorder, recurrent, chronic, moderate, possibly with psychotic features  Intellectual disability, mild    Patient and staff interview, chart review and documentation was 21 minutes.    Plan:  Medication Adjustment:  I will make no changes at this time.    Other:    We will continue with the current level supervision and support.      Continue with the support of the clinic, reassurance, and redirection. Staff monitoring and ongoing assessments per team plan. Current psychotropic medication appears to represent the minimum effective dosage and appears medically necessary. We will continue to monitor and reassess. This team will utilize appropriate emergency services if necessary. I will make myself available if concerns or problems arise.    Marito Matt MD

## 2021-07-14 NOTE — PATIENT INSTRUCTIONS
Patient is doing well and tolerating the medication.  I will make no changes at this time.  The patient will return to this clinic in 3 months for medication check.  She and her father agree to call before then with any questions or concerns.

## 2021-07-14 NOTE — NURSING NOTE
This video/telephone visit will be conducted via a call between you and your physician/provider. We have found that certain health care needs can be provided without the need for an in-person physical exam. This service lets us provide the care you need with a video /telephone conversation. If a prescription is necessary we can send it directly to your pharmacy. If lab work is needed we can place an order for that and you can then stop by our lab to have the test done at a later time.    Just as we bill insurance for in-person visits, we also bill insurance for video/telephone visits. If you have questions about your insurance coverage, we recommend that you speak with your insurance company.    Patient has given verbal consent for video/Telephone visit? yes  Patient would like telephone visit, please call: [phone]  WILLIAM/LUIS : Vito BARBER LPN  Patient verified allergies, medications and pharmacy via phone. Patient states she  is ready for visit.    ________________________________________  Medications Phoned  to Pharmacy [] yes [x]no  Name of Pharmacist:  List Medications, including dose, quantity and instructions    Medications ordered this visit were e-scribed.  Verified by order class [] yes  [x] no    Medication changes or discontinuations were communicated to patient's pharmacy: [] yes  [x] no    Dictation completed at time of chart check: [x] yes  [] no    I have checked the documentation for today s encounters and the above information has been reviewed and completed.

## 2021-10-13 ENCOUNTER — VIRTUAL VISIT (OUTPATIENT)
Dept: BEHAVIORAL HEALTH | Facility: CLINIC | Age: 46
End: 2021-10-13
Payer: MEDICARE

## 2021-10-13 DIAGNOSIS — F06.30 MOOD DISORDER IN CONDITIONS CLASSIFIED ELSEWHERE: Primary | ICD-10-CM

## 2021-10-13 PROCEDURE — 99443 PR PHYSICIAN TELEPHONE EVALUATION 21-30 MIN: CPT | Mod: 95 | Performed by: PSYCHIATRY & NEUROLOGY

## 2021-10-13 NOTE — NURSING NOTE
This video/telephone visit will be conducted via a call between you and your physician/provider. We have found that certain health care needs can be provided without the need for an in-person physical exam. This service lets us provide the care you need with a video /telephone conversation. If a prescription is necessary we can send it directly to your pharmacy. If lab work is needed we can place an order for that and you can then stop by our lab to have the test done at a later time.    Just as we bill insurance for in-person visits, we also bill insurance for video/telephone visits. If you have questions about your insurance coverage, we recommend that you speak with your insurance company.    Father (guardian) has given verbal consent for video/Telephone visit? yes  Patient would like telephone visit, please call: [phone]  WILLIAM/LUIS : Vito BARBER LPN  Current meds:  Depakote  mg takes 2 cap BID  Seroquel 100 mg in am and 200 mg Q HS  Effexor 150 mg  Take 2 caps QD   staff verified allergies, medications and pharmacy via phone and they are ready for visit.      ________________________________  Medications Phoned  to Pharmacy [] yes [x]no  Name of Pharmacist:  List Medications, including dose, quantity and instructions     Medications ordered this visit were e-scribed.  Verified by order class [] yes  [x] no    Medication changes or discontinuations were communicated to patient's pharmacy: [] yes  [x] no     Dictation completed at time of chart check: [x] yes  [] no     I have checked the documentation for today s encounters and the above information has been reviewed and completed.      Elly Franks on October 18, 2021 at 3:30 PM

## 2021-10-13 NOTE — PATIENT INSTRUCTIONS
I will make no psychotropic medication changes at this time.  The patient will continue with laboratory monitoring through her primary care clinic.  Both she and her father are comfortable with treatment plan and will return to this clinic in 3 months for medication check.  They agree to call before then with any questions or concerns.

## 2021-10-13 NOTE — PROGRESS NOTES
Outpatient Followup Psychiatric Evaluation    Pertinent History: Patient presents for the purposes of medication management.  She has a history of developmental delay.  She's had a history of mood instability and behavioral dyscontrol.     I saw the patient in October 2018.  She was doing quite well at that time and there were no medication changes.    I saw the patient in January 2019.  At that time the patient was doing quite well with no complaints.  She was looking into doing volunteer work.  We did not make any medication changes at that time.    I saw the patient in August 2019.  She was doing relatively well but still with occasional verbal outbursts when she was redirected.  She was overall however stable and the staff was able to manage her.  We did not make any psychotropic medication changes at that time.    I had a phone conversation with the patient July 6 of 2020.  This was during the coronavirus lockdown.  The patient was able to participate adequately and I had an opportunity to speak with the patient's staff member as well.  The patient's parents were aware of the phone call and told the staff they had no concerns or complaints and they chose not to participate in the interview as they stated things were going well.     I saw the patient in July 2020. I saw her for a phone visit and she was doing fairly well at that time. We did not make any medication changes. She was tolerating the medication.    The patient in November 2021.  She was doing quite well after just having returned from [place].  We did not make any medication changes at that time.    Saw the patient in January 2021.  She had had some conflict with new staff and was frustrated with some of the rules of the house but the staff did not feel a medication change was warranted.  We continued with the treatment plan.    Saw the patient in April 2021.  She was doing well at that time and tolerating medication would not make any medication  changes.    Patient on July 14 of 2021.  I also saw her father at that time.  They both reported the patient was doing well and they had no concerns or complaints.  She was tolerating the medication.  We did not make any medication changes at that time.      Current Symptoms:   I interviewed the patient as well as her father dog by phone today.  Both reported the patient was doing extremely well.  The patient denied having any significant depression or anxiety.  No desire to be dead or thoughts of harming herself.  No hallucinations or delusions.  She is sleeping well at night.  Appetite is good.  No new medical issues or diagnoses.  No new allergies.  The patient has received her flu shot and her coronavirus vaccination and tolerated both of those.  She continues to go to work and that is going well and she is keeping up.  She enjoys bike riding.  She states that she believes the medications of been helpful and she does not want them change.  Her father agrees and wants to continue with the current treatment plan.  The patient continues to have her labs done through her primary care clinic and they have been reassuring.      Current Medications: Please see chart. Medications personally reviewed.    Medication Compliance: yes    Side Effects to Medications:  no      Vitals:  Wt Readings from Last 3 Encounters:   08/19/20 (!) 291 lb (132 kg)   02/28/20 (!) 292 lb (132.5 kg)   08/19/19 (!) 292 lb (132.5 kg)     Temp Readings from Last 3 Encounters:   08/19/20 98.2  F (36.8  C) (Oral)   10/16/17 97.7  F (36.5  C) (Oral)   04/04/16 97.2  F (36.2  C) (Oral)     BP Readings from Last 3 Encounters:   08/19/20 111/82   02/28/20 115/74   08/19/19 125/81     Pulse Readings from Last 3 Encounters:   08/19/20 (!) 122   02/28/20 94   08/19/19 (!) 119         Mental Status Exam:   Appearance: The patient and her father were interviewed by phone.  They participated well.  Behavior: No agitation or irritability.  The patient was  cooperative during the phone call and she was engaged and polite.  Speech: Answers were appropriate but a bit vague.  This appeared baseline.  No pressured or rambling quality.  She initiated a bit but mostly participated by answering questions.  Father assisted a bit.  Mood/Affect: No depression.  No titi.  No irritability or lability.  Thought Content:  No reports of any psychosis. No evidence of any psychosis.   Suicidal or Homicidal Thoughts: Patient denied having any suicidal or homicidal ideation.  Her father has no concerns.  Thought Process/Formulation: Baseline slow and concrete.  Not disorganized.  No racing.  She does need occasional prompts and structure but this appears baseline.  Associations: Baseline vague.  Grossly adequate.  Fund of Knowledge:  Adequate. No obvious recent change.  Attention/Concentration: Attentive and participates but does not take much of the initiative.  No racing thoughts.  Not disorganized.  Insight:  Grossly unchanged.  Continues somewhat impaired  Judgement:  Grossly unchanged.  Continues somewhat impaired  Memory: Adequate.  Needing prompts and structure.    Motor Status:  No recent change reported. No current tremor.   Orientation: No reports of any recent change.   She appears oriented    Diagnosis managed and treated at today's visit :    Psychosis, NOS  Major depressive disorder, recurrent, chronic, moderate, possibly with psychotic features  Intellectual disability, mild    Patient and staff interview, chart review and documentation was 21 minutes.    Plan:  Medication Adjustment:  We will continue with the current medications.    Other:   The patient will return to this clinic in 3 months for medication check.  Both she and her father agree to call before then with any questions or concerns.      Total time for interview with patient and her father, chart review, documentation and coordination of care is 23 minutes.    Continue with the support of the clinic,  reassurance, and redirection. Staff monitoring and ongoing assessments per team plan. Current psychotropic medication appears to represent the minimum effective dosage and appears medically necessary. We will continue to monitor and reassess. This team will utilize appropriate emergency services if necessary. I will make myself available if concerns or problems arise.    Marito Matt MD

## 2021-12-01 DIAGNOSIS — F39 MOOD DISORDER (H): ICD-10-CM

## 2021-12-02 RX ORDER — QUETIAPINE FUMARATE 200 MG/1
TABLET, FILM COATED ORAL
Qty: 28 TABLET | Refills: 11 | Status: SHIPPED | OUTPATIENT
Start: 2021-12-02 | End: 2022-11-09

## 2021-12-02 NOTE — TELEPHONE ENCOUNTER
12/1/21-PROACTIVE REFILL REQUEST FOR CYCLE FILL. THANK YOU    Date of Last Office Visit: 10/13/21  Date of Next Office Visit:1/12/21  No shows since last visit: 0  Cancellations since last visit: 1 - Cx'd per provider    Medication requested: Seroquel 200 mg Date last ordered: 1/27/21 Qty: 30 Refills: 11       Disp Refills Start End YURIY    QUEtiapine (SEROQUEL) 200 MG tablet 30 tablet 11 1/27/2021  No   Sig - Route: Take 1 tablet (200 mg total) by mouth at bedtime. - Oral           Review of MN ?: NA      Lapse in medication adherence greater than 5 days?: No  If yes, call patient and gather details: NA  Medication refill request verified as identical to current order?: yes except Qty changed to 28 - as per filling cycle  Result of Last DAM, VPA, Li+ Level, CBC, or Carbamazepine Level (at or since last visit): N/A    Last visit treatment plan:       Plan:  Medication Adjustment:  We will continue with the current medications.    []Medication refilled per  Medication Refill in Ambulatory Care  policy.  [x]Medication unable to be refilled by RN due to criteria not met as indicated below:    []Eligibility - not seen in the last year   []Supervision - no future appointment   []Compliance - no shows, cancellations or lapse in therapy   []Verification - order discrepancy   []Controlled medication   [x]Medication not included in policy   []90-day supply request   [x]Other : LPN is processing request

## 2022-01-12 ENCOUNTER — VIRTUAL VISIT (OUTPATIENT)
Dept: BEHAVIORAL HEALTH | Facility: CLINIC | Age: 47
End: 2022-01-12
Payer: MEDICARE

## 2022-01-12 DIAGNOSIS — F39 MOOD DISORDER (H): Primary | ICD-10-CM

## 2022-01-12 PROCEDURE — 99442 PR PHYSICIAN TELEPHONE EVALUATION 11-20 MIN: CPT | Mod: 95 | Performed by: PSYCHIATRY & NEUROLOGY

## 2022-01-12 NOTE — PATIENT INSTRUCTIONS
The patient is doing relatively well from a psychiatric standpoint and tolerating the medication.  I will make no medication changes at this time.  The patient will return to this clinic in 4 to 6 months for medication check.  The patient's mother agrees to call before then with any questions or concerns.

## 2022-01-12 NOTE — PROGRESS NOTES
Outpatient Followup Psychiatric Evaluation    Pertinent History: Patient presents for the purposes of medication management.  She has a history of developmental delay.  She's had a history of mood instability and behavioral dyscontrol.     I saw the patient in October 2018.  She was doing quite well at that time and there were no medication changes.    I saw the patient in January 2019.  At that time the patient was doing quite well with no complaints.  She was looking into doing volunteer work.  We did not make any medication changes at that time.    I saw the patient in August 2019.  She was doing relatively well but still with occasional verbal outbursts when she was redirected.  She was overall however stable and the staff was able to manage her.  We did not make any psychotropic medication changes at that time.    I had a phone conversation with the patient July 6 of 2020.  This was during the coronavirus lockdown.  The patient was able to participate adequately and I had an opportunity to speak with the patient's staff member as well.  The patient's parents were aware of the phone call and told the staff they had no concerns or complaints and they chose not to participate in the interview as they stated things were going well.     I saw the patient in July 2020. I saw her for a phone visit and she was doing fairly well at that time. We did not make any medication changes. She was tolerating the medication.    The patient in November 2021.  She was doing quite well after just having returned from [place].  We did not make any medication changes at that time.    Saw the patient in January 2021.  She had had some conflict with new staff and was frustrated with some of the rules of the house but the staff did not feel a medication change was warranted.  We continued with the treatment plan.    Saw the patient in April 2021.  She was doing well at that time and tolerating medication would not make any medication  changes.    Patient on July 14 of 2021.  I also saw her father at that time.  They both reported the patient was doing well and they had no concerns or complaints.  She was tolerating the medication.  We did not make any medication changes at that time.    The patient was seen for a follow-up in October 2021.  The patient was doing well at that time and tolerating the medication.  I did not make any medication changes.      Current Symptoms:   I had an opportunity to interview the patient as well as her mom W.  The patient's father.weight  [employer].  About a month ago.  That has been difficult for the entire family but the patient has been doing well from a mood and behavior standpoint.  She continues to follow-up with her medical team regarding pending menopause and the mother is going to ask about possibly stopping the estrogen therapy.  We did discuss the possibility that moods may vary with changes in those medications and we will address those issues as they arise.    The patient denies any thoughts of suicide.  No hallucinations or delusions.  No titi.  The patient had 2 behavioral outbursts recently but they were likely related to the stress that the patient's under with the death of her father and many of the holiday festivities being canceled due to that.  The patient denies having any hallucinations or delusions.  The patient is sleeping well and eating well.  No change in concentration or cognition.  The patient has had no side effects to the medication and neither the patient nor the mother want any medication changes at this time.        Current Medications: Please see chart. Medications personally reviewed.    Medication Compliance: yes    Side Effects to Medications:  no      Vitals:  Wt Readings from Last 3 Encounters:   08/19/20 (!) 291 lb (132 kg)   02/28/20 (!) 292 lb (132.5 kg)   08/19/19 (!) 292 lb (132.5 kg)     Temp Readings from Last 3 Encounters:   08/19/20 98.2  F (36.8  C) (Oral)    10/16/17 97.7  F (36.5  C) (Oral)   04/04/16 97.2  F (36.2  C) (Oral)     BP Readings from Last 3 Encounters:   08/19/20 111/82   02/28/20 115/74   08/19/19 125/81     Pulse Readings from Last 3 Encounters:   08/19/20 (!) 122   02/28/20 94   08/19/19 (!) 119         Mental Status Exam:   Appearance: The patient and her mother were interviewed by phone.  Behavior: Patient had a couple of episodes of verbal outbursts in the context of some situational stressors but has returned back to baseline.  No recent behavioral difficulties.  The patient was calm and comfortable with me but vague.  Speech: Somewhat soft spoken and monotone.  Limited initiation.  All of this appears baseline.  No pressured or rambling quality.  Mood/Affect: No recent depression.  No evidence of any titi.  No lability.  Thought Content:  No reports of any psychosis. No evidence of any psychosis.   Suicidal or Homicidal Thoughts: No suicidal or homicidal ideation.  Thought Process/Formulation: Able to track.  No pressured or rambling quality to the speech.  She seems to be tracking well by his baseline concrete.  Associations: Baseline vague.  Grossly adequate.  Fund of Knowledge:  Adequate. No obvious recent change.  Attention/Concentration: Mother reports no recent changes.  The patient needs structure and prompts but is able to participate.  Insight:  Grossly unchanged.  Continues somewhat impaired  Judgement:  Grossly unchanged.  Continues somewhat impaired  Memory: Adequate.  Needing prompts and structure.    Motor Status:  No recent change reported. No current tremor.   Orientation: No reports of any recent change.   She appears oriented    Diagnosis managed and treated at today's visit :    Psychosis, NOS  Major depressive disorder, recurrent, chronic, moderate, possibly with psychotic features  Intellectual disability, mild    Patient and mother were interviewed, chart review and documentation was 20 minutes.    Plan:  Medication  Adjustment:  We will continue with the current medications.    Other:   The patient will return to this clinic in 4-6 months for medication check.  Both she and her mother agree to call before then with any questions or concerns.      Total time for interview with patient and her father, chart review, documentation and coordination of care is 20 minutes.    Continue with the support of the clinic, reassurance, and redirection. Staff monitoring and ongoing assessments per team plan. Current psychotropic medication appears to represent the minimum effective dosage and appears medically necessary. We will continue to monitor and reassess. This team will utilize appropriate emergency services if necessary. I will make myself available if concerns or problems arise.    Marito Matt MD

## 2022-01-12 NOTE — PROGRESS NOTES
Medications Phoned  to Pharmacy [] yes [x]no  Name of Pharmacist:  List Medications, including dose, quantity and instructions     Medications ordered this visit were e-scribed.  Verified by order class [] yes  [x] no    Medication changes or discontinuations were communicated to patient's pharmacy: [] yes  [x] no     Dictation completed at time of chart check: [x] yes  [] no     I have checked the documentation for today s encounters and the above information has been reviewed and completed.        Raven Casey, WILLIAM January 12, 2022 4:14 PM

## 2022-03-25 DIAGNOSIS — F06.30 MOOD DISORDER IN CONDITIONS CLASSIFIED ELSEWHERE: ICD-10-CM

## 2022-03-25 RX ORDER — QUETIAPINE FUMARATE 100 MG/1
TABLET, FILM COATED ORAL
Qty: 28 TABLET | Refills: 12 | Status: SHIPPED | OUTPATIENT
Start: 2022-03-25 | End: 2023-04-18

## 2022-03-25 NOTE — TELEPHONE ENCOUNTER
Date of Last Office Visit: 01/12/2022  Date of Next Office Visit: 6/08/2022  No shows since last visit: None  Cancellations since last visit: None    Medication requested: Quetiapine 100 mg tablet  Date last ordered: 4/12/2021 Qty: 28 Refills: 12     Review of MN ?: Writer does not have access.      Lapse in medication adherence greater than 5 days?: No  If yes, call patient and gather details: NA  Medication refill request verified as identical to current order?: Yes  Result of Last DAM, VPA, Li+ Level, CBC, or Carbamazepine Level (at or since last visit): NA    Last visit treatment plan: 01/12/2022      []Medication refilled per  Medication Refill in Ambulatory Care  policy.  []Medication unable to be refilled by RN due to criteria not met as indicated below:    []Eligibility - not seen in the last year   []Supervision - no future appointment   []Compliance - no shows, cancellations or lapse in therapy   []Verification - order discrepancy   []Controlled medication   []Medication not included in policy   []90-day supply request   [x]Other CMA Pending medication refills per CM/SMB

## 2022-06-16 DIAGNOSIS — F41.9 ANXIETY: ICD-10-CM

## 2022-06-17 RX ORDER — VENLAFAXINE HYDROCHLORIDE 150 MG/1
CAPSULE, EXTENDED RELEASE ORAL
Qty: 56 CAPSULE | Refills: 12 | OUTPATIENT
Start: 2022-06-17

## 2022-06-17 RX ORDER — DIVALPROEX SODIUM 500 MG/1
TABLET, EXTENDED RELEASE ORAL
Qty: 112 TABLET | Refills: 12 | OUTPATIENT
Start: 2022-06-17

## 2022-06-21 ENCOUNTER — TELEPHONE (OUTPATIENT)
Dept: BEHAVIORAL HEALTH | Facility: CLINIC | Age: 47
End: 2022-06-21

## 2022-06-21 NOTE — TELEPHONE ENCOUNTER
medication refill:    Do you use a [facility]?  Name of the pharmacy and phone number for the current request:  [facility]    Name of the medication requested: All meds, pt is running low. Faina requesting call back to coordinate. Pt set up with future appointment.    Other request: no    Can we leave a detailed message on this number? YES     Phone number patient can be reached at:    Best Time: Any    Call taken on 6/21/2022 at 2:16 PM by Cheikh Dozier

## 2022-06-22 DIAGNOSIS — F06.30 MOOD DISORDER IN CONDITIONS CLASSIFIED ELSEWHERE: ICD-10-CM

## 2022-06-22 RX ORDER — DIVALPROEX SODIUM 500 MG/1
TABLET, EXTENDED RELEASE ORAL
Qty: 112 TABLET | Refills: 11 | Status: SHIPPED | OUTPATIENT
Start: 2022-06-22 | End: 2023-10-03

## 2022-06-22 NOTE — TELEPHONE ENCOUNTER
Returned call to Faina, she will contact the pharmacy to request needed refills. Appt was made for follow up visit with Dr Matt.

## 2022-06-22 NOTE — TELEPHONE ENCOUNTER
Date of Last Office Visit: 1/12/2022  Date of Next Office Visit: 9/14/2022  No shows since last visit: 0  Cancellations since last visit: 0    Medication requested: divalproex (DEPAKOTE ER) 500 MG 24 hour tablet Date last ordered: 6/18/2021 Qty: 112 Refills: 12     Review of MN ?: N/A    Lapse in medication adherence greater than 5 days?: No  If yes, call patient and gather details: N/A  Medication refill request verified as identical to current order?: Yes    Result of Last DAM, VPA, Li+ Level, CBC, or Carbamazepine Level (at or since last visit): See below     Contains abnormal data Valproic Acid, Free (Only)  Order: [id number]   Ref Range & Units 1 yr ago   Valproic Acid, Free 6.0 - 22.0 ug/mL 31.7 High     Comment:                                 Detection Limit = 0.5   Patient drug level exceeds published reference range.  Evaluate   clinically for signs of potential toxicity.   Resulting Agency  [facility]     Narrative  Performed by [facility]  Performed at:  [facility] [address]  [id number]   : Alexander Larson MD, Phone:  [phone]  Specimen Collected: 02/03/21  3:48 PM Last Resulted: 02/08/21 12:06 PM       Last visit treatment plan:   Plan:  Medication Adjustment:  We will continue with the current medications.     Other:   The patient will return to this clinic in 4-6 months for medication check.  Both she and her mother agree to call before then with any questions or concerns.       []Medication refilled per  Medication Refill in Ambulatory Care  policy.  [x]Medication unable to be refilled by RN due to criteria not met as indicated below:    []Eligibility - not seen in the last year   []Supervision - no future appointment   []Compliance - no shows, cancellations or lapse in therapy   []Verification - order discrepancy   []Controlled medication   [x]Medication not included in policy   []90-day supply request   []Other

## 2022-06-23 DIAGNOSIS — F41.9 ANXIETY: ICD-10-CM

## 2022-06-23 RX ORDER — VENLAFAXINE HYDROCHLORIDE 150 MG/1
CAPSULE, EXTENDED RELEASE ORAL
Qty: 56 CAPSULE | Refills: 12 | Status: SHIPPED | OUTPATIENT
Start: 2022-06-23 | End: 2023-05-02

## 2022-06-23 NOTE — TELEPHONE ENCOUNTER
Date of Last Office Visit: 1/12/2022  Date of Next Office Visit: 9/14/2022  No shows since last visit: 6/8/22  Cancellations since last visit: 0     Medication requested: Venlafaxine 150mg Date last ordered: 6/18/2021 Qty: 56 Refills: 12     Review of MN ?: N/A     Lapse in medication adherence greater than 5 days?: No  If yes, call patient and gather details:   Medication refill request verified as identical to current order?: Yes     Result of Last DAM, VPA, Li+ Level, CBC, or Carbamazepine Level (at or since last visit): N/A    Last visit treatment plan:   Last visit treatment plan:   Plan:  Medication Adjustment:  We will continue with the current medications.     Other:   The patient will return to this clinic in 4-6 months for medication check.  Both she and her mother agree to call before then with any questions or concerns.    []Medication refilled per  Medication Refill in Ambulatory Care  policy.  [x]Medication unable to be refilled by RN due to criteria not met as indicated below:    []Eligibility - not seen in the last year   []Supervision - no future appointment   [x]Compliance - no shows, cancellations or lapse in therapy   []Verification - order discrepancy   []Controlled medication   []Medication not included in policy   []90-day supply request   []Other

## 2022-11-07 DIAGNOSIS — F39 MOOD DISORDER (H): ICD-10-CM

## 2022-11-08 NOTE — TELEPHONE ENCOUNTER
Date of Last Office Visit: 01/12/2022  Date of Next Office Visit: NONE- [facility] Intake to contact patient and schedule as schedule allows.   No shows since last visit: 2 6/18/2022 & 9/141/2022  Cancellations since last visit: 1 11/02/2022 Left without being seen    Medication requested: QUEtiapine (SEROQUEL) 200 MG tablet Date last ordered: 12/02/2021 Qty: 28 Refills: 11     Review of MN ?: Writer does not have access at this time.       Lapse in medication adherence greater than 5 days?: No   If yes, call patient and gather details: NA  Medication refill request verified as identical to current order?: Yes   Result of Last DAM, VPA, Li+ Level, CBC, or Carbamazepine Level (at or since last visit): N/A    Last visit treatment plan: 01/12/2022  Plan:  Medication Adjustment:  We will continue with the current medications.     Other:   The patient will return to this clinic in 4-6 months for medication check.  Both she and her mother agree to call before then with any questions or concerns.    []Medication refilled per  Medication Refill in Ambulatory Care  policy.  [x]Medication unable to be refilled by RN due to criteria not met as indicated below:    []Eligibility - not seen in the last year   [x]Supervision - no future appointment   [x]Compliance - no shows, cancellations or lapse in therapy   []Verification - order discrepancy   []Controlled medication   []Medication not included in policy   []90-day supply request   [x]Other CMA pending medication refills

## 2022-11-09 RX ORDER — QUETIAPINE FUMARATE 200 MG/1
TABLET, FILM COATED ORAL
Qty: 28 TABLET | Refills: 12 | Status: SHIPPED | OUTPATIENT
Start: 2022-11-09 | End: 2023-05-02

## 2023-04-17 DIAGNOSIS — F06.30 MOOD DISORDER IN CONDITIONS CLASSIFIED ELSEWHERE: ICD-10-CM

## 2023-04-18 ENCOUNTER — TELEPHONE (OUTPATIENT)
Dept: NEUROLOGY | Facility: CLINIC | Age: 48
End: 2023-04-18
Payer: MEDICARE

## 2023-04-18 RX ORDER — QUETIAPINE FUMARATE 100 MG/1
TABLET, FILM COATED ORAL
Qty: 28 TABLET | Refills: 12 | Status: SHIPPED | OUTPATIENT
Start: 2023-04-18 | End: 2023-05-02

## 2023-04-18 NOTE — TELEPHONE ENCOUNTER
Refill request for Quetiapine 100 mg  Last follow-up was no-show. Has not been seen since 1/2022. Pt on list to transfer to [facility] for follow-up care.  Medication T'd for review and signature  Betito Perry, DELMIS ATC on 4/18/2023 at 9:46 AM    QUEtiapine (SEROQUEL) 100 MG tablet 28 tablet 12 3/25/2022  No   Sig: TAKE 1 TABLET BY MOUTH EVERY MORNING

## 2023-04-18 NOTE — TELEPHONE ENCOUNTER
Medication was sent to provider yesterday for signature. Will re-send medication for provider signature.    DELMIS Thomas ATC on 4/18/2023 at 9:45 AM

## 2023-04-18 NOTE — TELEPHONE ENCOUNTER
Lilian Egan from facility called, Pt got 200 mg of Quetiapine but not the 100 mg dose. She really needs that too. [facility] in [place] [facility]. Lilian's ph is [phone].

## 2023-05-02 ENCOUNTER — TELEPHONE (OUTPATIENT)
Dept: NEUROLOGY | Facility: CLINIC | Age: 48
End: 2023-05-02

## 2023-05-02 ENCOUNTER — VIRTUAL VISIT (OUTPATIENT)
Dept: NEUROLOGY | Facility: CLINIC | Age: 48
End: 2023-05-02
Payer: MEDICARE

## 2023-05-02 DIAGNOSIS — F39 MOOD DISORDER (H): ICD-10-CM

## 2023-05-02 DIAGNOSIS — F06.30 MOOD DISORDER IN CONDITIONS CLASSIFIED ELSEWHERE: ICD-10-CM

## 2023-05-02 DIAGNOSIS — F41.9 ANXIETY: ICD-10-CM

## 2023-05-02 PROCEDURE — 99442 PR PHYSICIAN TELEPHONE EVALUATION 11-20 MIN: CPT | Mod: 95 | Performed by: PSYCHIATRY & NEUROLOGY

## 2023-05-02 RX ORDER — QUETIAPINE FUMARATE 100 MG/1
100 TABLET, FILM COATED ORAL EVERY MORNING
Qty: 28 TABLET | Refills: 9 | Status: SHIPPED | OUTPATIENT
Start: 2023-05-02 | End: 2023-10-03

## 2023-05-02 RX ORDER — VENLAFAXINE HYDROCHLORIDE 150 MG/1
CAPSULE, EXTENDED RELEASE ORAL
Qty: 56 CAPSULE | Refills: 12 | Status: SHIPPED | OUTPATIENT
Start: 2023-05-02 | End: 2023-10-03

## 2023-05-02 RX ORDER — QUETIAPINE FUMARATE 200 MG/1
200 TABLET, FILM COATED ORAL AT BEDTIME
Qty: 28 TABLET | Refills: 9 | Status: SHIPPED | OUTPATIENT
Start: 2023-05-02 | End: 2023-10-03

## 2023-05-02 NOTE — PROGRESS NOTES
Outpatient Followup Psychiatric Evaluation    Pertinent History: Patient presents for the purposes of medication management.  She has a history of developmental delay.  She's had a history of mood instability and behavioral dyscontrol.     I saw the patient in October 2018.  She was doing quite well at that time and there were no medication changes.    I saw the patient in January 2019.  At that time the patient was doing quite well with no complaints.  She was looking into doing volunteer work.  We did not make any medication changes at that time.    I saw the patient in August 2019.  She was doing relatively well but still with occasional verbal outbursts when she was redirected.  She was overall however stable and the staff was able to manage her.  We did not make any psychotropic medication changes at that time.    I had a phone conversation with the patient July 6 of 2020.  This was during the coronavirus lockdown.  The patient was able to participate adequately and I had an opportunity to speak with the patient's staff member as well.  The patient's parents were aware of the phone call and told the staff they had no concerns or complaints and they chose not to participate in the interview as they stated things were going well.     I saw the patient in July 2020. I saw her for a phone visit and she was doing fairly well at that time. We did not make any medication changes. She was tolerating the medication.    The patient in November 2021.  She was doing quite well after just having returned from [place].  We did not make any medication changes at that time.    Saw the patient in January 2021.  She had had some conflict with new staff and was frustrated with some of the rules of the house but the staff did not feel a medication change was warranted.  We continued with the treatment plan.    Saw the patient in April 2021.  She was doing well at that time and tolerating medication would not make any medication  changes.    Patient on July 14 of 2021.  I also saw her father at that time.  They both reported the patient was doing well and they had no concerns or complaints.  She was tolerating the medication.  We did not make any medication changes at that time.    The patient was seen for a follow-up in October 2021.  The patient was doing well at that time and tolerating the medication.  I did not make any medication changes.    The patient had a follow-up visit with me in January 2022.  No significant change or problems at that time and she was tolerating the medication.    The patient had a follow-up scheduled with me in September 2022 but was a no-show.      Current Symptoms:   I did speak with the patient as well as a staff member today.  The staff reported the patient was doing well and they had no questions or concerns.  She seemed to be tolerating the medication.  I reviewed the records and she continues to be followed through the [facility].  She had labs done in January and continues to be followed by Dr. Diggs.  There is been no new medical issues or concerns.    On my interview with the patient today she tells me she is doing well.  She denies feeling significantly depressed.  She denies feeling hopeless or helpless or worthless.  No thoughts of hurting herself or others.  No hallucinations or delusions.  She reports she is sleeping well and eating well.  She states concentration is good.  She denies having any recent behavioral problems or concerns.  There is been no aggression or irritability.  She states she believes the medications have been helpful and she denies having any side effects.  She does not want any medication changes.  The staff do not want any medication changes.  We may try tapering the meds at some point in the future but at this point everybody would like to continue with the current treatment plan.        Current Medications: Please see chart. Medications personally  reviewed.    Medication Compliance: yes    Side Effects to Medications:  no      Vitals:  Wt Readings from Last 3 Encounters:   08/19/20 (!) 291 lb (132 kg)   02/28/20 (!) 292 lb (132.5 kg)   08/19/19 (!) 292 lb (132.5 kg)     Temp Readings from Last 3 Encounters:   08/19/20 98.2  F (36.8  C) (Oral)   10/16/17 97.7  F (36.5  C) (Oral)   04/04/16 97.2  F (36.2  C) (Oral)     BP Readings from Last 3 Encounters:   08/19/20 111/82   02/28/20 115/74   08/19/19 125/81     Pulse Readings from Last 3 Encounters:   08/19/20 (!) 122   02/28/20 94   08/19/19 (!) 119         Mental Status Exam:   Appearance: The patient and staff were interviewed by phone.  Behavior: The patient was cooperative but vague.  The staff and patient denies any recent behavioral dyscontrol or irritability.  She was cooperative with me.  Speech: Somewhat soft spoken and monotone.  Flat and somewhat slow.  Needing structure and p.o. but answers were consistent although vague.  Mood/Affect: No recent depression.  No evidence of any titi.  No lability.  She again was baseline flat on the interview.  Thought Content:  No reports of any psychosis. No evidence of any psychosis.   Suicidal or Homicidal Thoughts: No suicidal or homicidal ideation.  Thought Process/Formulation: Somewhat monotone but able to participate.  Vague responses.  Not pressured or rambling.  Not thick or slurred.  Associations: Baseline vague.  No significant change.  Fund of Knowledge:  Adequate. No obvious recent change.  Attention/Concentration: Able to track and follow.  Both she and the staff report there is been no drop-off in concentration or focus.  She continues to need structure.  Insight: Continues impaired.  No obvious change.  Judgement:  Grossly unchanged.  Continues somewhat impaired  Memory: Adequate.  She is a vague historian with no obvious changes.  Motor Status:  No recent change reported. No current tremor.   Orientation: No reports of any recent change.   She  appears oriented    Diagnosis managed and treated at today's visit :    Psychosis, NOS  Major depressive disorder, recurrent, chronic, moderate, possibly with psychotic features  Intellectual disability, mild    The patient and staff were interviewed by phone.  We utilized [employer].  The patient was at her group home and I was in my office.  The conversation took about 20 minutes.  Documentation and coordination of care was an additional 5 minutes.    Plan:  Medication Adjustment:  We will continue with the current medications.  She will continue with labs through her primary care clinic and Dr. Diggs in [place].    Other:   The patient will return to this clinic in 4-6 months for medication check.  The staff agree to contact us before then with any questions or concerns.      Continue with the support of the clinic, reassurance, and redirection. Staff monitoring and ongoing assessments per team plan. Current psychotropic medication appears to represent the minimum effective dosage and appears medically necessary. We will continue to monitor and reassess. This team will utilize appropriate emergency services if necessary. I will make myself available if concerns or problems arise.    Marito Matt MD

## 2023-05-02 NOTE — PATIENT INSTRUCTIONS
The patient appears to be relatively stable as per the patient and staff.  She is tolerating the medication.  I will make no changes at this time the patient will return to this clinic in 4 to 6 months for medication check.

## 2023-05-02 NOTE — LETTER
5/2/2023         RE: Paige Nelson  [address] [address]  [place] [address]        Dear Colleague,    Thank you for referring your patient, Paige Nelson, to the [facility]UROLOG[facility]INIC [facility]. Please see a copy of my visit note below.    Outpatient Followup Psychiatric Evaluation    Pertinent History: Patient presents for the purposes of medication management.  She has a history of developmental delay.  She's had a history of mood instability and behavioral dyscontrol.     I saw the patient in October 2018.  She was doing quite well at that time and there were no medication changes.    I saw the patient in January 2019.  At that time the patient was doing quite well with no complaints.  She was looking into doing volunteer work.  We did not make any medication changes at that time.    I saw the patient in August 2019.  She was doing relatively well but still with occasional verbal outbursts when she was redirected.  She was overall however stable and the staff was able to manage her.  We did not make any psychotropic medication changes at that time.    I had a phone conversation with the patient July 6 of 2020.  This was during the coronavirus lockdown.  The patient was able to participate adequately and I had an opportunity to speak with the patient's staff member as well.  The patient's parents were aware of the phone call and told the staff they had no concerns or complaints and they chose not to participate in the interview as they stated things were going well.     I saw the patient in July 2020. I saw her for a phone visit and she was doing fairly well at that time. We did not make any medication changes. She was tolerating the medication.    The patient in November 2021.  She was doing quite well after just having returned from [place].  We did not make any medication changes at that time.    Saw the patient in January 2021.  She had had some conflict  with new staff and was frustrated with some of the rules of the house but the staff did not feel a medication change was warranted.  We continued with the treatment plan.    Saw the patient in April 2021.  She was doing well at that time and tolerating medication would not make any medication changes.    Patient on July 14 of 2021.  I also saw her father at that time.  They both reported the patient was doing well and they had no concerns or complaints.  She was tolerating the medication.  We did not make any medication changes at that time.    The patient was seen for a follow-up in October 2021.  The patient was doing well at that time and tolerating the medication.  I did not make any medication changes.    The patient had a follow-up visit with me in January 2022.  No significant change or problems at that time and she was tolerating the medication.    The patient had a follow-up scheduled with me in September 2022 but was a no-show.      Current Symptoms:   I did speak with the patient as well as a staff member today.  The staff reported the patient was doing well and they had no questions or concerns.  She seemed to be tolerating the medication.  I reviewed the records and she continues to be followed through the [facility].  She had labs done in January and continues to be followed by Dr. Diggs.  There is been no new medical issues or concerns.    On my interview with the patient today she tells me she is doing well.  She denies feeling significantly depressed.  She denies feeling hopeless or helpless or worthless.  No thoughts of hurting herself or others.  No hallucinations or delusions.  She reports she is sleeping well and eating well.  She states concentration is good.  She denies having any recent behavioral problems or concerns.  There is been no aggression or irritability.  She states she believes the medications have been helpful and she denies having any side effects.  She does not want any  medication changes.  The staff do not want any medication changes.  We may try tapering the meds at some point in the future but at this point everybody would like to continue with the current treatment plan.        Current Medications: Please see chart. Medications personally reviewed.    Medication Compliance: yes    Side Effects to Medications:  no      Vitals:  Wt Readings from Last 3 Encounters:   08/19/20 (!) 291 lb (132 kg)   02/28/20 (!) 292 lb (132.5 kg)   08/19/19 (!) 292 lb (132.5 kg)     Temp Readings from Last 3 Encounters:   08/19/20 98.2  F (36.8  C) (Oral)   10/16/17 97.7  F (36.5  C) (Oral)   04/04/16 97.2  F (36.2  C) (Oral)     BP Readings from Last 3 Encounters:   08/19/20 111/82   02/28/20 115/74   08/19/19 125/81     Pulse Readings from Last 3 Encounters:   08/19/20 (!) 122   02/28/20 94   08/19/19 (!) 119         Mental Status Exam:   Appearance: The patient and staff were interviewed by phone.  Behavior: The patient was cooperative but vague.  The staff and patient denies any recent behavioral dyscontrol or irritability.  She was cooperative with me.  Speech: Somewhat soft spoken and monotone.  Flat and somewhat slow.  Needing structure and p.o. but answers were consistent although vague.  Mood/Affect: No recent depression.  No evidence of any titi.  No lability.  She again was baseline flat on the interview.  Thought Content:  No reports of any psychosis. No evidence of any psychosis.   Suicidal or Homicidal Thoughts: No suicidal or homicidal ideation.  Thought Process/Formulation: Somewhat monotone but able to participate.  Vague responses.  Not pressured or rambling.  Not thick or slurred.  Associations: Baseline vague.  No significant change.  Fund of Knowledge:  Adequate. No obvious recent change.  Attention/Concentration: Able to track and follow.  Both she and the staff report there is been no drop-off in concentration or focus.  She continues to need structure.  Insight: Continues  impaired.  No obvious change.  Judgement:  Grossly unchanged.  Continues somewhat impaired  Memory: Adequate.  She is a vague historian with no obvious changes.  Motor Status:  No recent change reported. No current tremor.   Orientation: No reports of any recent change.   She appears oriented    Diagnosis managed and treated at today's visit :    Psychosis, NOS  Major depressive disorder, recurrent, chronic, moderate, possibly with psychotic features  Intellectual disability, mild    The patient and staff were interviewed by phone.  We utilized [employer].  The patient was at her group home and I was in my office.  The conversation took about 20 minutes.  Documentation and coordination of care was an additional 5 minutes.    Plan:  Medication Adjustment:  We will continue with the current medications.  She will continue with labs through her primary care clinic and Dr. Diggs in [place].    Other:   The patient will return to this clinic in 4-6 months for medication check.  The staff agree to contact us before then with any questions or concerns.      Continue with the support of the clinic, reassurance, and redirection. Staff monitoring and ongoing assessments per team plan. Current psychotropic medication appears to represent the minimum effective dosage and appears medically necessary. We will continue to monitor and reassess. This team will utilize appropriate emergency services if necessary. I will make myself available if concerns or problems arise.    Marito Matt MD          Again, thank you for allowing me to participate in the care of your patient.        Sincerely,        Marito Matt MD

## 2023-05-02 NOTE — TELEPHONE ENCOUNTER
1st attempt and scheduled  4-6 month follow-up with staff supervisor at the house for 10/3 at 0800

## 2023-05-02 NOTE — NURSING NOTE
Is the patient currently in the state of [place]? YES    Visit mode:TELEPHONE    If the visit is dropped, the patient can be reconnected by: TELEPHONE VISIT: Phone number: [phone]    Will anyone else be joining the visit? NO      How would you like to obtain your AVS? Mail a copy    Are changes needed to the allergy or medication list? NO    Reason for visit: Follow Up (Med check )

## 2023-05-22 DIAGNOSIS — F06.30 MOOD DISORDER IN CONDITIONS CLASSIFIED ELSEWHERE: Primary | ICD-10-CM

## 2023-05-23 RX ORDER — DIVALPROEX SODIUM 500 MG/1
TABLET, EXTENDED RELEASE ORAL
Qty: 120 TABLET | Refills: 4 | Status: SHIPPED | OUTPATIENT
Start: 2023-05-23 | End: 2023-10-03

## 2023-05-23 NOTE — TELEPHONE ENCOUNTER
Medication refill request for     divalproex sodium extended-release (DEPAKOTE ER) 500 MG 24 hr tablet, Last refilled:  6/22/2022, Last Fill Quantity: 112,  # refills: 11    Last office visit provider:  5/2/2023  Next appointment scheduled:   10/3/2023 8:00 AM Marito Matt MD     Medication T'd for review and signature    DONNA Claudio on 5/23/2023 at 9:56 AM       English

## 2023-10-03 ENCOUNTER — VIRTUAL VISIT (OUTPATIENT)
Dept: NEUROLOGY | Facility: CLINIC | Age: 48
End: 2023-10-03
Payer: MEDICARE

## 2023-10-03 DIAGNOSIS — F06.30 MOOD DISORDER IN CONDITIONS CLASSIFIED ELSEWHERE: ICD-10-CM

## 2023-10-03 DIAGNOSIS — F41.9 ANXIETY: ICD-10-CM

## 2023-10-03 DIAGNOSIS — F39 MOOD DISORDER (H): ICD-10-CM

## 2023-10-03 PROCEDURE — 99443 PR PHYSICIAN TELEPHONE EVALUATION 21-30 MIN: CPT | Mod: 95 | Performed by: PSYCHIATRY & NEUROLOGY

## 2023-10-03 RX ORDER — DIVALPROEX SODIUM 500 MG/1
TABLET, EXTENDED RELEASE ORAL
Qty: 112 TABLET | Refills: 11 | Status: SHIPPED | OUTPATIENT
Start: 2023-10-03 | End: 2024-08-06

## 2023-10-03 RX ORDER — VENLAFAXINE HYDROCHLORIDE 150 MG/1
CAPSULE, EXTENDED RELEASE ORAL
Qty: 56 CAPSULE | Refills: 12 | Status: SHIPPED | OUTPATIENT
Start: 2023-10-03 | End: 2024-09-05

## 2023-10-03 RX ORDER — METOPROLOL TARTRATE 25 MG/1
1 TABLET, FILM COATED ORAL 2 TIMES DAILY
COMMUNITY
Start: 2023-08-21

## 2023-10-03 RX ORDER — NORETHINDRONE ACETATE 5 MG
5 TABLET ORAL
COMMUNITY
Start: 2023-09-29

## 2023-10-03 RX ORDER — QUETIAPINE FUMARATE 100 MG/1
100 TABLET, FILM COATED ORAL EVERY MORNING
Qty: 28 TABLET | Refills: 9 | Status: SHIPPED | OUTPATIENT
Start: 2023-10-03 | End: 2024-07-24

## 2023-10-03 RX ORDER — QUETIAPINE FUMARATE 200 MG/1
200 TABLET, FILM COATED ORAL AT BEDTIME
Qty: 28 TABLET | Refills: 9 | Status: SHIPPED | OUTPATIENT
Start: 2023-10-03 | End: 2024-09-05

## 2023-10-03 NOTE — PATIENT INSTRUCTIONS
The patient is doing well and tolerating medication.  I will make no changes at this time.  The patient should return to this clinic in 4 to 6 months for follow-up and agrees to contact us before then with any questions or concerns.

## 2023-10-03 NOTE — LETTER
10/3/2023         RE: Paige Nelson  [address] [facility] [address]  [place] [address]        Dear Colleague,    Thank you for referring your patient, Paige Nelson, to the [facility] NEUROLOG[facility]. Please see a copy of my visit note below.    Virtual Visit Details    Type of service:  Telephone Visit   Phone call duration: 22 minutes     Outpatient Followup Psychiatric Evaluation    Pertinent History: Patient presents for the purposes of medication management.  She has a history of developmental delay.  She's had a history of mood instability and behavioral dyscontrol.     I saw the patient in October 2018.  She was doing quite well at that time and there were no medication changes.    I saw the patient in January 2019.  At that time the patient was doing quite well with no complaints.  She was looking into doing volunteer work.  We did not make any medication changes at that time.    I saw the patient in August 2019.  She was doing relatively well but still with occasional verbal outbursts when she was redirected.  She was overall however stable and the staff was able to manage her.  We did not make any psychotropic medication changes at that time.    I had a phone conversation with the patient July 6 of 2020.  This was during the coronavirus lockdown.  The patient was able to participate adequately and I had an opportunity to speak with the patient's staff member as well.  The patient's parents were aware of the phone call and told the staff they had no concerns or complaints and they chose not to participate in the interview as they stated things were going well.     I saw the patient in July 2020. I saw her for a phone visit and she was doing fairly well at that time. We did not make any medication changes. She was tolerating the medication.    The patient in November 2021.  She was doing quite well after just having returned from [place].  We did not make any  medication changes at that time.    Saw the patient in January 2021.  She had had some conflict with new staff and was frustrated with some of the rules of the house but the staff did not feel a medication change was warranted.  We continued with the treatment plan.    Saw the patient in April 2021.  She was doing well at that time and tolerating medication would not make any medication changes.    Patient on July 14 of 2021.  I also saw her father at that time.  They both reported the patient was doing well and they had no concerns or complaints.  She was tolerating the medication.  We did not make any medication changes at that time.    The patient was seen for a follow-up in October 2021.  The patient was doing well at that time and tolerating the medication.  I did not make any medication changes.    The patient had a follow-up visit with me in January 2022.  No significant change or problems at that time and she was tolerating the medication.    The patient had a follow-up scheduled with me in September 2022 but was a no-show.    The patient returned to clinic in May 2023 she was doing well at that time and tolerating the medication and both she and her treatment team did not any medication changes.  I did not make any changes at that time.      Current Symptoms:   I did review the patient's records and she apparently was seen in the emergency room with SVT.  Apparently that was related to not using her CPAP machine and she is now using it.  Otherwise no new concerns or complaints.  She believes she is doing quite well.  The patient presents at this time stating things are going relatively well and there are no particular concerns or complaints. No significant depression. No feelings of hopelessness or worthlessness or guilt. No anxiety or panic attacks. No reports of any symptoms consistent with titi. The patient reports sleep is adequate. No significant fatigue during the day. No excessive energy. The  patient reports no significant change with appetite or weight. No significant change with concentration or focus or energy. No impulsive or inappropriate behaviors. The patient denies having any hallucinations or delusions. No significant change in cognition. The patient reports good tolerance of the medication with no evidence of any significant side effects. No new medical concerns or complaints. No new allergies.        Current Medications: Please see chart. Medications personally reviewed.    Medication Compliance: yes    Side Effects to Medications:  no      Vitals:  Wt Readings from Last 3 Encounters:   08/19/20 (!) 291 lb (132 kg)   02/28/20 (!) 292 lb (132.5 kg)   08/19/19 (!) 292 lb (132.5 kg)     Temp Readings from Last 3 Encounters:   08/19/20 98.2  F (36.8  C) (Oral)   10/16/17 97.7  F (36.5  C) (Oral)   04/04/16 97.2  F (36.2  C) (Oral)     BP Readings from Last 3 Encounters:   08/19/20 111/82   02/28/20 115/74   08/19/19 125/81     Pulse Readings from Last 3 Encounters:   08/19/20 (!) 122   02/28/20 94   08/19/19 (!) 119         Mental Status Exam:   Appearance: Patient does not appear uncomfortable.  There is no obvious pain or shortness of breath.  No new issues or concerns are reported.   Behavior: Patient is cooperative.  Able to interact appropriately.  No agitation.  Not restless.  No reports of any recent behavioral dyscontrol.  The patient does participate and is cooperative.  Able to initiate.  Speech: Participating with intact sentence structure.  No pressured or rambling quality.  Answers are appropriate and consistent.  Able to dialogue and initiate.  Mood/Affect: No obvious depression or anxiety.  No irritability or lability.  No evidence of any titi.  Thought Content:  No evidence of any psychosis. The patient denies any psychotic symptoms. No reports of any recent psychosis.  Suicidal or Homicidal Thoughts: None apparent or reported.  The patient denies having any homicidal or suicidal  ideation.  Thought Process/Formulation: Participating.  Able to follow conversation.  No thought blocking.  No racing thoughts.  Not disorganized.  No evidence of any pressured thinking.  Associations:  Grossly intact.  Processing information appropriately.  Able to track conversation..  Fund of Knowledge:  Grossly unchanged.  Attention/Concentration: Concentration appears adequate.  The patient is following and participating well.  Attentive.  No disorganization.  No racing thoughts.  Insight:  Grossly intact.   Judgement:  Grossly intact.   Memory:  Grossly intact.   Motor Status:  No recent change reported. No current tremor.  Orientation: Grossly oriented..    Diagnosis managed and treated at today's visit :    Psychosis, NOS  Major depressive disorder, recurrent, chronic, moderate, possibly with psychotic features  Intellectual disability, mild    The patient was interviewed by phone today.  She was cooperative and pleasant.  No new issues or concerns.    Plan:  Medication Adjustment:  We will continue with the current medications.  We may consider future decrease but the patient does not want to this time.  I have reordered her liver function tests CBC and Depakote level.    Other:   The patient will return to this clinic in 4-6 months for medication check.  The staff agree to contact us before then with any questions or concerns.      Continue with the support of the clinic, reassurance, and redirection. Staff monitoring and ongoing assessments per team plan. Current psychotropic medication appears to represent the minimum effective dosage and appears medically necessary. We will continue to monitor and reassess. This team will utilize appropriate emergency services if necessary. I will make myself available if concerns or problems arise.    Marito Matt MD        Again, thank you for allowing me to participate in the care of your patient.        Sincerely,        Marito Matt MD

## 2023-10-03 NOTE — NURSING NOTE
Is the patient currently in the state of [place]? YES    Visit mode:TELEPHONE    If the visit is dropped, the patient can be reconnected by: VIDEO VISIT: Text to cell phone:   Telephone Information:   Mobile [phone]       Will anyone else be joining the visit? NO  (If patient encounters technical issues they should call [phone]    How would you like to obtain your AVS? MyChart    Are changes needed to the allergy or medication list? No    Reason for visit: Follow Up    Karen KERR

## 2023-10-03 NOTE — PROGRESS NOTES
Virtual Visit Details    Type of service:  Telephone Visit   Phone call duration: 22 minutes     Outpatient Followup Psychiatric Evaluation    Pertinent History: Patient presents for the purposes of medication management.  She has a history of developmental delay.  She's had a history of mood instability and behavioral dyscontrol.     I saw the patient in October 2018.  She was doing quite well at that time and there were no medication changes.    I saw the patient in January 2019.  At that time the patient was doing quite well with no complaints.  She was looking into doing volunteer work.  We did not make any medication changes at that time.    I saw the patient in August 2019.  She was doing relatively well but still with occasional verbal outbursts when she was redirected.  She was overall however stable and the staff was able to manage her.  We did not make any psychotropic medication changes at that time.    I had a phone conversation with the patient July 6 of 2020.  This was during the coronavirus lockdown.  The patient was able to participate adequately and I had an opportunity to speak with the patient's staff member as well.  The patient's parents were aware of the phone call and told the staff they had no concerns or complaints and they chose not to participate in the interview as they stated things were going well.     I saw the patient in July 2020. I saw her for a phone visit and she was doing fairly well at that time. We did not make any medication changes. She was tolerating the medication.    The patient in November 2021.  She was doing quite well after just having returned from [place].  We did not make any medication changes at that time.    Saw the patient in January 2021.  She had had some conflict with new staff and was frustrated with some of the rules of the house but the staff did not feel a medication change was warranted.  We continued with the treatment plan.    Saw the patient in April  2021.  She was doing well at that time and tolerating medication would not make any medication changes.    Patient on July 14 of 2021.  I also saw her father at that time.  They both reported the patient was doing well and they had no concerns or complaints.  She was tolerating the medication.  We did not make any medication changes at that time.    The patient was seen for a follow-up in October 2021.  The patient was doing well at that time and tolerating the medication.  I did not make any medication changes.    The patient had a follow-up visit with me in January 2022.  No significant change or problems at that time and she was tolerating the medication.    The patient had a follow-up scheduled with me in September 2022 but was a no-show.    The patient returned to clinic in May 2023 she was doing well at that time and tolerating the medication and both she and her treatment team did not any medication changes.  I did not make any changes at that time.      Current Symptoms:   I did review the patient's records and she apparently was seen in the emergency room with SVT.  Apparently that was related to not using her CPAP machine and she is now using it.  Otherwise no new concerns or complaints.  She believes she is doing quite well.  The patient presents at this time stating things are going relatively well and there are no particular concerns or complaints. No significant depression. No feelings of hopelessness or worthlessness or guilt. No anxiety or panic attacks. No reports of any symptoms consistent with titi. The patient reports sleep is adequate. No significant fatigue during the day. No excessive energy. The patient reports no significant change with appetite or weight. No significant change with concentration or focus or energy. No impulsive or inappropriate behaviors. The patient denies having any hallucinations or delusions. No significant change in cognition. The patient reports good tolerance of the  medication with no evidence of any significant side effects. No new medical concerns or complaints. No new allergies.        Current Medications: Please see chart. Medications personally reviewed.    Medication Compliance: yes    Side Effects to Medications:  no      Vitals:  Wt Readings from Last 3 Encounters:   08/19/20 (!) 291 lb (132 kg)   02/28/20 (!) 292 lb (132.5 kg)   08/19/19 (!) 292 lb (132.5 kg)     Temp Readings from Last 3 Encounters:   08/19/20 98.2  F (36.8  C) (Oral)   10/16/17 97.7  F (36.5  C) (Oral)   04/04/16 97.2  F (36.2  C) (Oral)     BP Readings from Last 3 Encounters:   08/19/20 111/82   02/28/20 115/74   08/19/19 125/81     Pulse Readings from Last 3 Encounters:   08/19/20 (!) 122   02/28/20 94   08/19/19 (!) 119         Mental Status Exam:   Appearance: Patient does not appear uncomfortable.  There is no obvious pain or shortness of breath.  No new issues or concerns are reported.   Behavior: Patient is cooperative.  Able to interact appropriately.  No agitation.  Not restless.  No reports of any recent behavioral dyscontrol.  The patient does participate and is cooperative.  Able to initiate.  Speech: Participating with intact sentence structure.  No pressured or rambling quality.  Answers are appropriate and consistent.  Able to dialogue and initiate.  Mood/Affect: No obvious depression or anxiety.  No irritability or lability.  No evidence of any titi.  Thought Content:  No evidence of any psychosis. The patient denies any psychotic symptoms. No reports of any recent psychosis.  Suicidal or Homicidal Thoughts: None apparent or reported.  The patient denies having any homicidal or suicidal ideation.  Thought Process/Formulation: Participating.  Able to follow conversation.  No thought blocking.  No racing thoughts.  Not disorganized.  No evidence of any pressured thinking.  Associations:  Grossly intact.  Processing information appropriately.  Able to track conversation..  Fund of  Knowledge:  Grossly unchanged.  Attention/Concentration: Concentration appears adequate.  The patient is following and participating well.  Attentive.  No disorganization.  No racing thoughts.  Insight:  Grossly intact.   Judgement:  Grossly intact.   Memory:  Grossly intact.   Motor Status:  No recent change reported. No current tremor.  Orientation: Grossly oriented..    Diagnosis managed and treated at today's visit :    Psychosis, NOS  Major depressive disorder, recurrent, chronic, moderate, possibly with psychotic features  Intellectual disability, mild    The patient was interviewed by phone today.  She was cooperative and pleasant.  No new issues or concerns.    Plan:  Medication Adjustment:  We will continue with the current medications.  We may consider future decrease but the patient does not want to this time.  I have reordered her liver function tests CBC and Depakote level.    Other:   The patient will return to this clinic in 4-6 months for medication check.  The staff agree to contact us before then with any questions or concerns.      Continue with the support of the clinic, reassurance, and redirection. Staff monitoring and ongoing assessments per team plan. Current psychotropic medication appears to represent the minimum effective dosage and appears medically necessary. We will continue to monitor and reassess. This team will utilize appropriate emergency services if necessary. I will make myself available if concerns or problems arise.    Marito Matt MD

## 2024-07-19 DIAGNOSIS — F06.30 MOOD DISORDER IN CONDITIONS CLASSIFIED ELSEWHERE: ICD-10-CM

## 2024-07-22 NOTE — TELEPHONE ENCOUNTER
Refill request for the following medication (s) listed below.    Pending Prescriptions:                       Disp   Refills    QUEtiapine (SEROQUEL) 100 MG tablet [Phar*28 tab*0            Sig: TAKE 1 TABLET BY MOUTH EVERY MORNING      Last office visit provider:  10/3/2023  Next appointment scheduled: none      Medication T'd for review and signature

## 2024-07-23 ENCOUNTER — TELEPHONE (OUTPATIENT)
Dept: NEUROLOGY | Facility: CLINIC | Age: 49
End: 2024-07-23
Payer: MEDICARE

## 2024-07-23 NOTE — TELEPHONE ENCOUNTER
[facility] Call Center    Phone Message    May a detailed message be left on voicemail: yes     Reason for Call: Medication Refill Request    Has the patient contacted the pharmacy for the refill? Yes   Name of medication being requested:   QUEtiapine (SEROQUEL) 100 MG tablet  Provider who prescribed the medication:   Marito Matt MD  Pharmacy: [facility] Address: [address] [place] [address]  Date medication is needed: 07/23/2024       Action Taken: Message routed to:  Other: [facility]    Travel Screening: Not Applicable     Date of Service:

## 2024-07-24 RX ORDER — QUETIAPINE FUMARATE 100 MG/1
100 TABLET, FILM COATED ORAL EVERY MORNING
Qty: 28 TABLET | Refills: 0 | Status: SHIPPED | OUTPATIENT
Start: 2024-07-24 | End: 2024-09-05

## 2024-08-02 ENCOUNTER — TELEPHONE (OUTPATIENT)
Dept: NEUROLOGY | Facility: CLINIC | Age: 49
End: 2024-08-02
Payer: MEDICARE

## 2024-08-02 NOTE — TELEPHONE ENCOUNTER
Hello,    Please call pt to schedule for next available follow up visit with Dr. Matt.    Thank you,  Janina Navarro MA on 8/2/2024 at 9:07 AM

## 2024-08-02 NOTE — TELEPHONE ENCOUNTER
NEXT APPT  With Neurology (aMrito Matt MD)  09/05/2024 at 7:40 AM    Janina Navarro MA on 8/2/2024 at 2:59 PM

## 2024-09-05 ENCOUNTER — VIRTUAL VISIT (OUTPATIENT)
Dept: NEUROLOGY | Facility: CLINIC | Age: 49
End: 2024-09-05
Payer: MEDICARE

## 2024-09-05 DIAGNOSIS — F06.30 MOOD DISORDER IN CONDITIONS CLASSIFIED ELSEWHERE: ICD-10-CM

## 2024-09-05 DIAGNOSIS — F41.9 ANXIETY: ICD-10-CM

## 2024-09-05 DIAGNOSIS — F39 MOOD DISORDER (H): ICD-10-CM

## 2024-09-05 PROCEDURE — 99443 PR PHYSICIAN TELEPHONE EVALUATION 21-30 MIN: CPT | Mod: 93 | Performed by: PSYCHIATRY & NEUROLOGY

## 2024-09-05 RX ORDER — QUETIAPINE FUMARATE 100 MG/1
100 TABLET, FILM COATED ORAL EVERY MORNING
Qty: 28 TABLET | Refills: 0 | Status: SHIPPED | OUTPATIENT
Start: 2024-09-05 | End: 2024-09-26

## 2024-09-05 RX ORDER — QUETIAPINE FUMARATE 200 MG/1
200 TABLET, FILM COATED ORAL AT BEDTIME
Qty: 28 TABLET | Refills: 9 | Status: SHIPPED | OUTPATIENT
Start: 2024-09-05 | End: 2024-09-26

## 2024-09-05 RX ORDER — DIVALPROEX SODIUM 500 MG/1
TABLET, EXTENDED RELEASE ORAL
Qty: 112 TABLET | Refills: 0 | Status: SHIPPED | OUTPATIENT
Start: 2024-09-05 | End: 2024-09-16

## 2024-09-05 RX ORDER — VENLAFAXINE HYDROCHLORIDE 150 MG/1
CAPSULE, EXTENDED RELEASE ORAL
Qty: 56 CAPSULE | Refills: 12 | Status: SHIPPED | OUTPATIENT
Start: 2024-09-05 | End: 2024-09-26

## 2024-09-05 ASSESSMENT — PAIN SCALES - GENERAL: PAINLEVEL: NO PAIN (0)

## 2024-09-05 NOTE — NURSING NOTE
Current patient location: [address] [place] [id number]    Is the patient currently in the state of [place]? YES    Visit mode:TELEPHONE    If the visit is dropped, the patient can be reconnected by:  PHONE CALL    Will anyone else be joining the visit? NO  (If patient encounters technical issues they should call [phone] :[phone])    How would you like to obtain your AVS? MyChart    Are changes needed to the allergy or medication list? No    Are refills needed on medications prescribed by this physician? NO    Rooming Documentation:  Staff checked pt in       Reason for visit: Follow Up    Karen KERR

## 2024-09-05 NOTE — LETTER
9/5/2024      Paige Nelson  [address] [place] [address] [address]  [facility] [id number]      Dear Colleague,    Thank you for referring your patient, Paige Nelson, to the [facility]. Please see a copy of my visit note below.    Virtual Visit Details    Type of service:  Telephone Visit   Phone call duration: 26 minutes   Originating Location (pt. Location): The patient's home  Distant Location (provider location): My home office  We utilized my chart    Outpatient Followup Psychiatric Evaluation    Pertinent History: Patient presents for the purposes of medication management.  She has a history of developmental delay.  She's had a history of mood instability and behavioral dyscontrol.     I saw the patient in October 2018.  She was doing quite well at that time and there were no medication changes.    I saw the patient in January 2019.  At that time the patient was doing quite well with no complaints.  She was looking into doing volunteer work.  We did not make any medication changes at that time.    I saw the patient in August 2019.  She was doing relatively well but still with occasional verbal outbursts when she was redirected.  She was overall however stable and the staff was able to manage her.  We did not make any psychotropic medication changes at that time.    I had a phone conversation with the patient July 6 of 2020.  This was during the coronavirus lockdown.  The patient was able to participate adequately and I had an opportunity to speak with the patient's staff member as well.  The patient's parents were aware of the phone call and told the staff they had no concerns or complaints and they chose not to participate in the interview as they stated things were going well.     I saw the patient in July 2020. I saw her for a phone visit and she was doing fairly well at that time. We did not make any medication changes. She was tolerating the  medication.    The patient in November 2021.  She was doing quite well after just having returned from [place].  We did not make any medication changes at that time.    Saw the patient in January 2021.  She had had some conflict with new staff and was frustrated with some of the rules of the house but the staff did not feel a medication change was warranted.  We continued with the treatment plan.    Saw the patient in April 2021.  She was doing well at that time and tolerating medication would not make any medication changes.    Patient on July 14 of 2021.  I also saw her father at that time.  They both reported the patient was doing well and they had no concerns or complaints.  She was tolerating the medication.  We did not make any medication changes at that time.    The patient was seen for a follow-up in October 2021.  The patient was doing well at that time and tolerating the medication.  I did not make any medication changes.    The patient had a follow-up visit with me in January 2022.  No significant change or problems at that time and she was tolerating the medication.    The patient had a follow-up scheduled with me in September 2022 but was a no-show.    The patient returned to clinic in May 2023 she was doing well at that time and tolerating the medication and both she and her treatment team did not any medication changes.  I did not make any changes at that time.    The patient had a follow-up appointment with me in October 2023.  She was doing extremely well at that time and tolerating the medication.  She continue to follow with her medical team for general medical care as well as her lab work.  There were no new issues and the staff are requesting no changes.  The patient did have a period where she was not using her CPAP machine and was seen in the emergency room.  She now is using the machine.  We continued with the treatment plan.      Current Symptoms:   The patient returns today for a med  management visit along with her staff.  All report the patient is doing extremely well and is tolerating the medication.  There is no new medical issues or concerns and her mood has been good.  No change or problems with cognition.  No hallucinations or delusions.  No suicidality.  No side effects to the medication.  She is sleeping well and eating well.  No new medical issues or concerns.  She continues to follow with her medical team for general medical care as well as labs.  I did attempt to review records.  I will recheck labs including a Depakote level although the patient has been stable with the current medications for quite some time.  We did talk about possibly trying to taper the Depakote.  She has never had any seizures that staff are aware of.  We elected to decrease the Depakote to 500 mg twice a day.  I did inform them that I was going to be leaving this clinic.        Current Medications: Please see chart. Medications personally reviewed.    Medication Compliance: yes    Side Effects to Medications:  no          Mental Status Exam:   Appearance: Phone interview but the staff report no change in the patient's presentation.  She is been alert.  No evidence of any tremor or abnormal movements.  Behavior: Patient is cooperative.  Able to interact appropriately.  No agitation.  The staff report no behavioral problems no concerns.  Speech: Participating with intact sentence structure.  Baseline monotone but answers are appropriate and she is able to initiate and engage.  Answers are somewhat vague.  No pressured or rambling quality.  Answers are appropriate and consistent.  Able to dialogue and initiate.  Mood/Affect: No obvious depression or anxiety.  Staff has not noted any depression or irritability.  Tracking adequately.  The staff report no changes or problems.  Thought Content:  No evidence of any psychosis. The patient denies any psychotic symptoms. No reports of any recent psychosis.  Suicidal or  Homicidal Thoughts: None apparent or reported.  The patient denies having any homicidal or suicidal ideation.  Thought Process/Formulation: Participating.  Able to follow conversation.  Tracking and following the conversation well.  Associations:  Grossly intact.  Processing information appropriately.  Able to track conversation.  Not disorganized.  No racing thoughts..  Fund of Knowledge:  Grossly unchanged.  Attention/Concentration: Concentration appears adequate.  The patient is following and participating well.  Attentive.  No disorganization.  No racing thoughts.  Insight:  Grossly intact.   Judgement:  Grossly intact.   Memory:  Grossly intact.   Motor Status:  No recent change reported. No current tremor.  Orientation: Grossly oriented..    Diagnosis managed and treated at today's visit :    Psychosis, NOS  Major depressive disorder, recurrent, chronic, moderate, possibly with psychotic features  Intellectual disability, mild    The patient was interviewed by phone today.  She was cooperative and pleasant.  No new issues or concerns.    Plan:  Medication Adjustment:  I am going to decrease the Depakote to 500 mg twice a day.  Risks and benefits were discussed.  She will continue to follow with her medical team.  I reviewed her records and will again reorder a Depakote level as well as other general screening labs.  I stressed to them the importance of following through with this and I will see her back in 2 weeks to readdress this.        Continue with the support of the clinic, reassurance, and redirection. Staff monitoring and ongoing assessments per team plan. Current psychotropic medication appears to represent the minimum effective dosage and appears medically necessary. We will continue to monitor and reassess. This team will utilize appropriate emergency services if necessary. I will make myself available if concerns or problems arise.    Marito Matt MD        Again, thank you for allowing me to  participate in the care of your patient.        Sincerely,        Marito Matt MD

## 2024-09-05 NOTE — TELEPHONE ENCOUNTER
[facility] Call Center    Phone Message    May a detailed message be left on voicemail: yes     Reason for Call: Patient's mother, Adenike, requesting a call back to discuss lowering dosage of Depakote    Action Taken: Dr. Matt    [facility]    Travel Screening: Not Applicable     Date of Service:

## 2024-09-05 NOTE — PATIENT INSTRUCTIONS
I am going to decrease the Depakote to 500 mg twice a day.  Risks and benefits were discussed.  She will continue to follow with her medical team.  I reviewed her records and will again reorder a Depakote level as well as other general screening labs.  I stressed to them the importance of following through with this and I will see her back in 2 weeks to readdress this.    Please make sure that the staff knows the blood work needs to be done next week so I can review that at our next visit in 2 to 3 weeks.  Thanks.

## 2024-09-05 NOTE — PROGRESS NOTES
Virtual Visit Details    Type of service:  Telephone Visit   Phone call duration: 26 minutes   Originating Location (pt. Location): The patient's home  Distant Location (provider location): My home office  We utilized my chart    Outpatient Followup Psychiatric Evaluation    Pertinent History: Patient presents for the purposes of medication management.  She has a history of developmental delay.  She's had a history of mood instability and behavioral dyscontrol.     I saw the patient in October 2018.  She was doing quite well at that time and there were no medication changes.    I saw the patient in January 2019.  At that time the patient was doing quite well with no complaints.  She was looking into doing volunteer work.  We did not make any medication changes at that time.    I saw the patient in August 2019.  She was doing relatively well but still with occasional verbal outbursts when she was redirected.  She was overall however stable and the staff was able to manage her.  We did not make any psychotropic medication changes at that time.    I had a phone conversation with the patient July 6 of 2020.  This was during the coronavirus lockdown.  The patient was able to participate adequately and I had an opportunity to speak with the patient's staff member as well.  The patient's parents were aware of the phone call and told the staff they had no concerns or complaints and they chose not to participate in the interview as they stated things were going well.     I saw the patient in July 2020. I saw her for a phone visit and she was doing fairly well at that time. We did not make any medication changes. She was tolerating the medication.    The patient in November 2021.  She was doing quite well after just having returned from [place].  We did not make any medication changes at that time.    Saw the patient in January 2021.  She had had some conflict with new staff and was frustrated with some of the rules of the  house but the staff did not feel a medication change was warranted.  We continued with the treatment plan.    Saw the patient in April 2021.  She was doing well at that time and tolerating medication would not make any medication changes.    Patient on July 14 of 2021.  I also saw her father at that time.  They both reported the patient was doing well and they had no concerns or complaints.  She was tolerating the medication.  We did not make any medication changes at that time.    The patient was seen for a follow-up in October 2021.  The patient was doing well at that time and tolerating the medication.  I did not make any medication changes.    The patient had a follow-up visit with me in January 2022.  No significant change or problems at that time and she was tolerating the medication.    The patient had a follow-up scheduled with me in September 2022 but was a no-show.    The patient returned to clinic in May 2023 she was doing well at that time and tolerating the medication and both she and her treatment team did not any medication changes.  I did not make any changes at that time.    The patient had a follow-up appointment with me in October 2023.  She was doing extremely well at that time and tolerating the medication.  She continue to follow with her medical team for general medical care as well as her lab work.  There were no new issues and the staff are requesting no changes.  The patient did have a period where she was not using her CPAP machine and was seen in the emergency room.  She now is using the machine.  We continued with the treatment plan.      Current Symptoms:   The patient returns today for a med management visit along with her staff.  All report the patient is doing extremely well and is tolerating the medication.  There is no new medical issues or concerns and her mood has been good.  No change or problems with cognition.  No hallucinations or delusions.  No suicidality.  No side effects  to the medication.  She is sleeping well and eating well.  No new medical issues or concerns.  She continues to follow with her medical team for general medical care as well as labs.  I did attempt to review records.  I will recheck labs including a Depakote level although the patient has been stable with the current medications for quite some time.  We did talk about possibly trying to taper the Depakote.  She has never had any seizures that staff are aware of.  We elected to decrease the Depakote to 500 mg twice a day.  I did inform them that I was going to be leaving this clinic.        Current Medications: Please see chart. Medications personally reviewed.    Medication Compliance: yes    Side Effects to Medications:  no          Mental Status Exam:   Appearance: Phone interview but the staff report no change in the patient's presentation.  She is been alert.  No evidence of any tremor or abnormal movements.  Behavior: Patient is cooperative.  Able to interact appropriately.  No agitation.  The staff report no behavioral problems no concerns.  Speech: Participating with intact sentence structure.  Baseline monotone but answers are appropriate and she is able to initiate and engage.  Answers are somewhat vague.  No pressured or rambling quality.  Answers are appropriate and consistent.  Able to dialogue and initiate.  Mood/Affect: No obvious depression or anxiety.  Staff has not noted any depression or irritability.  Tracking adequately.  The staff report no changes or problems.  Thought Content:  No evidence of any psychosis. The patient denies any psychotic symptoms. No reports of any recent psychosis.  Suicidal or Homicidal Thoughts: None apparent or reported.  The patient denies having any homicidal or suicidal ideation.  Thought Process/Formulation: Participating.  Able to follow conversation.  Tracking and following the conversation well.  Associations:  Grossly intact.  Processing information  appropriately.  Able to track conversation.  Not disorganized.  No racing thoughts..  Fund of Knowledge:  Grossly unchanged.  Attention/Concentration: Concentration appears adequate.  The patient is following and participating well.  Attentive.  No disorganization.  No racing thoughts.  Insight:  Grossly intact.   Judgement:  Grossly intact.   Memory:  Grossly intact.   Motor Status:  No recent change reported. No current tremor.  Orientation: Grossly oriented..    Diagnosis managed and treated at today's visit :    Psychosis, NOS  Major depressive disorder, recurrent, chronic, moderate, possibly with psychotic features  Intellectual disability, mild    The patient was interviewed by phone today.  She was cooperative and pleasant.  No new issues or concerns.    Plan:  Medication Adjustment:  I am going to decrease the Depakote to 500 mg twice a day.  Risks and benefits were discussed.  She will continue to follow with her medical team.  I reviewed her records and will again reorder a Depakote level as well as other general screening labs.  I stressed to them the importance of following through with this and I will see her back in 2 weeks to readdress this.        Continue with the support of the clinic, reassurance, and redirection. Staff monitoring and ongoing assessments per team plan. Current psychotropic medication appears to represent the minimum effective dosage and appears medically necessary. We will continue to monitor and reassess. This team will utilize appropriate emergency services if necessary. I will make myself available if concerns or problems arise.    Marito Matt MD

## 2024-09-06 NOTE — TELEPHONE ENCOUNTER
"Called back to patient's mom, conveying that without Guardianship paperwork, or a consent to communicate on file, then we are not allowed to provide patient protected information.    Adenike states confusion (\"I've always been able to talk to Dr. Matt.\"), also states patient's group home is confused about plan of care.    Routing to clinic care team to reach out to patient's group home to clarify any needs from their standpoint. Encouraged patient's mother to also communicate with patient's group home for POC updates.    Jimi Victor, RN, BSN  [facility]    "

## 2024-09-06 NOTE — TELEPHONE ENCOUNTER
[facility] Call Center    Phone Message    May a detailed message be left on voicemail: yes     Reason for Call: Pt's mother Adenike is requesting a call back from a care team member as she would like to discuss Pt's phone visit from yesterday 9/6/24 with provider.     Please cotnact Adenike at [phone]    Action Taken: Message routed to:  Other: [facility]     Travel Screening: Not Applicable

## 2024-09-12 NOTE — TELEPHONE ENCOUNTER
[facility] Call Center    Phone Message    May a detailed message be left on voicemail: yes     Reason for Call: Medication Question or concern regarding medication   Prescription Clarification  Name of Medication: divalproex sodium extended-release (DEPAKOTE ER) 500 MG 24 hr tablet   Prescribing Provider: Marito Matt MD    Pharmacy:   [facility] [address] [place] [address]      What on the order needs clarification? Lilian with group home feels it is not a good idea to cut dosage in half and wonders why the provider is wanting to do this.   They state patient completed a recent blood draw with primary care Dr. Diggs with [facility] on 9/6/2024.  Lilian can be reached at [phone].      Action Taken: Message routed to:  Other: [facility]    Travel Screening: Not Applicable     Date of Service:

## 2024-09-13 NOTE — TELEPHONE ENCOUNTER
Dr. Matt- patients group [facility] is concerned about your plan to decrease depakote ER to 500mg BID (was previously taking 1000mg BID).     On 9/6 PCP team zahra the labs you ordered including valproic acid lab which resulted 54 on 9/6. Please review these labs and advise if you still recommend decreasing Depakote dosing as previously recommended and also what your rationale is for decreasing the dose.      Thank you  Suman Angela RN, BSN  [facility]

## 2024-09-15 NOTE — TELEPHONE ENCOUNTER
I do not believe that she has an underlying seizure disorder.  We are using the medication for behavioral stability.  As she gets older I would like to make sure were not giving her more medication than she actually needs.  If the staff are uncomfortable with the decrease we can continue at the current dosage and discuss this at our next meeting.

## 2024-09-16 NOTE — TELEPHONE ENCOUNTER
Discussed provider message below with Lilian (group home staff), she would prefer that depakote dose stays at 1000mg BID until next appt. She reports that pt has recently had some increased behaviors and they are concerned about lowering dose.     Night staff attended last appt and did not have accurate knowledge of daytime behaviors.     Rx pended below for MD review and signature. Depakote ER 500mg tablets (sig: Take 2 tablets (1000mg) BID).     Pt scheduled for follow up 9/26. We did discuss provider departure from clinic 10/15 and that pt should establish care through private practice or another provider as soon as possible. Informed that medication refills would not be provided after 10/15.     Suman Angela, RN, BSN  [facility]

## 2024-09-17 RX ORDER — DIVALPROEX SODIUM 500 MG/1
TABLET, EXTENDED RELEASE ORAL
Qty: 120 TABLET | Refills: 0 | Status: SHIPPED | OUTPATIENT
Start: 2024-09-17 | End: 2024-09-26

## 2024-09-26 ENCOUNTER — VIRTUAL VISIT (OUTPATIENT)
Dept: NEUROLOGY | Facility: CLINIC | Age: 49
End: 2024-09-26
Payer: MEDICARE

## 2024-09-26 DIAGNOSIS — F39 MOOD DISORDER (H): ICD-10-CM

## 2024-09-26 DIAGNOSIS — F06.30 MOOD DISORDER IN CONDITIONS CLASSIFIED ELSEWHERE: ICD-10-CM

## 2024-09-26 DIAGNOSIS — F41.9 ANXIETY: ICD-10-CM

## 2024-09-26 PROCEDURE — 99443 PR PHYSICIAN TELEPHONE EVALUATION 21-30 MIN: CPT | Mod: 93 | Performed by: PSYCHIATRY & NEUROLOGY

## 2024-09-26 RX ORDER — QUETIAPINE FUMARATE 100 MG/1
100 TABLET, FILM COATED ORAL EVERY MORNING
Qty: 28 TABLET | Refills: 5 | Status: SHIPPED | OUTPATIENT
Start: 2024-09-26

## 2024-09-26 RX ORDER — VENLAFAXINE HYDROCHLORIDE 150 MG/1
CAPSULE, EXTENDED RELEASE ORAL
Qty: 56 CAPSULE | Refills: 12 | Status: SHIPPED | OUTPATIENT
Start: 2024-09-26

## 2024-09-26 RX ORDER — QUETIAPINE FUMARATE 200 MG/1
200 TABLET, FILM COATED ORAL AT BEDTIME
Qty: 28 TABLET | Refills: 9 | Status: SHIPPED | OUTPATIENT
Start: 2024-09-26

## 2024-09-26 RX ORDER — DIVALPROEX SODIUM 500 MG/1
TABLET, EXTENDED RELEASE ORAL
Qty: 120 TABLET | Refills: 5 | Status: SHIPPED | OUTPATIENT
Start: 2024-09-26

## 2024-09-26 ASSESSMENT — PAIN SCALES - GENERAL: PAINLEVEL: NO PAIN (0)

## 2024-09-26 NOTE — PATIENT INSTRUCTIONS
I will make no changes and we will continue with the current dose of the Depakote.  Apparently the decrease in medication at the previous meeting did not occur.  The patient will continue to follow with her medical team.  Recent labs were reviewed and reassuring.  I did inform the staff that I was going to be leaving this clinic.   Erythromycin Counseling:  I discussed with the patient the risks of erythromycin including but not limited to GI upset, allergic reaction, drug rash, diarrhea, increase in liver enzymes, and yeast infections.

## 2024-09-26 NOTE — LETTER
9/26/2024      Paige Nelson  [address] [place] [address] [address]  [facility] [id number]      Dear Colleague,    Thank you for referring your patient, Paige Nelson, to the [facility]. Please see a copy of my visit note below.    Virtual Visit Details    Type of service:  Telephone Visit   Phone call duration: 24 minutes   Originating Location (pt. Location): The patient's home    Distant Location (provider location): My home office    Outpatient Followup Psychiatric Evaluation    Pertinent History: Patient presents for the purposes of medication management.  She has a history of developmental delay.  She's had a history of mood instability and behavioral dyscontrol.     I saw the patient in October 2018.  She was doing quite well at that time and there were no medication changes.    I saw the patient in January 2019.  At that time the patient was doing quite well with no complaints.  She was looking into doing volunteer work.  We did not make any medication changes at that time.    I saw the patient in August 2019.  She was doing relatively well but still with occasional verbal outbursts when she was redirected.  She was overall however stable and the staff was able to manage her.  We did not make any psychotropic medication changes at that time.    I had a phone conversation with the patient July 6 of 2020.  This was during the coronavirus lockdown.  The patient was able to participate adequately and I had an opportunity to speak with the patient's staff member as well.  The patient's parents were aware of the phone call and told the staff they had no concerns or complaints and they chose not to participate in the interview as they stated things were going well.     I saw the patient in July 2020. I saw her for a phone visit and she was doing fairly well at that time. We did not make any medication changes. She was tolerating the medication.    The patient in  November 2021.  She was doing quite well after just having returned from [place].  We did not make any medication changes at that time.    Saw the patient in January 2021.  She had had some conflict with new staff and was frustrated with some of the rules of the house but the staff did not feel a medication change was warranted.  We continued with the treatment plan.    Saw the patient in April 2021.  She was doing well at that time and tolerating medication would not make any medication changes.    Patient on July 14 of 2021.  I also saw her father at that time.  They both reported the patient was doing well and they had no concerns or complaints.  She was tolerating the medication.  We did not make any medication changes at that time.    The patient was seen for a follow-up in October 2021.  The patient was doing well at that time and tolerating the medication.  I did not make any medication changes.    The patient had a follow-up visit with me in January 2022.  No significant change or problems at that time and she was tolerating the medication.    The patient had a follow-up scheduled with me in September 2022 but was a no-show.    The patient returned to clinic in May 2023 she was doing well at that time and tolerating the medication and both she and her treatment team did not any medication changes.  I did not make any changes at that time.    The patient had a follow-up appointment with me in October 2023.  She was doing extremely well at that time and tolerating the medication.  She continue to follow with her medical team for general medical care as well as her lab work.  There were no new issues and the staff are requesting no changes.  The patient did have a period where she was not using her CPAP machine and was seen in the emergency room.  She now is using the machine.  We continued with the treatment plan.    The patient was seen on September 5, 2024.  At that time she was tolerating the medication and  doing fairly well from a mood and behavior standpoint and we were hoping to try a taper some of the Depakote.  I did decrease her Depakote back slightly and we scheduled her for a lab draw.      Current Symptoms:   The patient was unable to participate much today but I did speak with the staff member Antonia who stated that they did not decrease the Depakote as they felt it was helpful for her.  We did receive a Depakote level of 54 which was reassuring.  Recent other labs were reassuring.  The staff wanted to continue with the current treatment plan so we did not make any changes.  The patient did not appear according to the staff to be significantly depressed.  There was no hallucinations or delusions.  No titi.  No change in sleep or appetite.  No change in cognition.  No obvious side effects or tremors.  We elected to continue with the current treatment plan.  I did inform the patient that I would be leaving the clinic.      Current Medications: Please see chart. Medications personally reviewed.    Medication Compliance: yes    Side Effects to Medications:  no          Mental Status Exam:   Appearance: Staff report no change.  No new tremors or abnormal movements.  Behavior: Patient is cooperative.  At times somewhat disinterested.  No change.  Speech: Vague slow and monotone according to staff with no significant change.  Mood/Affect: No obvious depression or anxiety.  No titi.  Staff has not noted any depression or irritability.    Thought Content:  No evidence of any psychosis. Homicidal Thoughts: None apparent or reported.    Thought Process/Formulation: Participating.  Able to follow conversation.  Tracking and following the conversation well.  Associations:  Grossly intact.  Processing information appropriately.  Continues vague and concrete according to the staff.  Able to track conversation.  Not disorganized.  No racing thoughts..  Fund of Knowledge:  Grossly unchanged.  Attention/Concentration:  Concentration appears adequate.  The patient is following and participating well.  Attentive.  No disorganization.  No racing thoughts.  Insight:  Grossly intact.   Judgement:  Grossly intact.   Memory:  Grossly intact.   Motor Status:  No recent change reported. No current tremor.  Orientation: Grossly oriented..    Diagnosis managed and treated at today's visit :    Psychosis, NOS  Major depressive disorder, recurrent, chronic, moderate, possibly with psychotic features  Intellectual disability, mild      Plan:  Medication Adjustment:  I will make no changes and we will continue with the current dose of the Depakote.  Apparently the decrease in medication at the previous meeting did not occur.  The patient will continue to follow with her medical team.  Recent labs were reviewed and reassuring.  I did inform the staff that I was going to be leaving this clinic.        Continue with the support of the clinic, reassurance, and redirection. Staff monitoring and ongoing assessments per team plan. Current psychotropic medication appears to represent the minimum effective dosage and appears medically necessary. We will continue to monitor and reassess. This team will utilize appropriate emergency services if necessary. I will make myself available if concerns or problems arise.    Marito Matt MD        Again, thank you for allowing me to participate in the care of your patient.        Sincerely,        Marito Matt MD

## 2024-09-26 NOTE — PROGRESS NOTES
Virtual Visit Details    Type of service:  Telephone Visit   Phone call duration: 24 minutes   Originating Location (pt. Location): The patient's home    Distant Location (provider location): My home office    Outpatient Followup Psychiatric Evaluation    Pertinent History: Patient presents for the purposes of medication management.  She has a history of developmental delay.  She's had a history of mood instability and behavioral dyscontrol.     I saw the patient in October 2018.  She was doing quite well at that time and there were no medication changes.    I saw the patient in January 2019.  At that time the patient was doing quite well with no complaints.  She was looking into doing volunteer work.  We did not make any medication changes at that time.    I saw the patient in August 2019.  She was doing relatively well but still with occasional verbal outbursts when she was redirected.  She was overall however stable and the staff was able to manage her.  We did not make any psychotropic medication changes at that time.    I had a phone conversation with the patient July 6 of 2020.  This was during the coronavirus lockdown.  The patient was able to participate adequately and I had an opportunity to speak with the patient's staff member as well.  The patient's parents were aware of the phone call and told the staff they had no concerns or complaints and they chose not to participate in the interview as they stated things were going well.     I saw the patient in July 2020. I saw her for a phone visit and she was doing fairly well at that time. We did not make any medication changes. She was tolerating the medication.    The patient in November 2021.  She was doing quite well after just having returned from [place].  We did not make any medication changes at that time.    Saw the patient in January 2021.  She had had some conflict with new staff and was frustrated with some of the rules of the house but the staff  did not feel a medication change was warranted.  We continued with the treatment plan.    Saw the patient in April 2021.  She was doing well at that time and tolerating medication would not make any medication changes.    Patient on July 14 of 2021.  I also saw her father at that time.  They both reported the patient was doing well and they had no concerns or complaints.  She was tolerating the medication.  We did not make any medication changes at that time.    The patient was seen for a follow-up in October 2021.  The patient was doing well at that time and tolerating the medication.  I did not make any medication changes.    The patient had a follow-up visit with me in January 2022.  No significant change or problems at that time and she was tolerating the medication.    The patient had a follow-up scheduled with me in September 2022 but was a no-show.    The patient returned to clinic in May 2023 she was doing well at that time and tolerating the medication and both she and her treatment team did not any medication changes.  I did not make any changes at that time.    The patient had a follow-up appointment with me in October 2023.  She was doing extremely well at that time and tolerating the medication.  She continue to follow with her medical team for general medical care as well as her lab work.  There were no new issues and the staff are requesting no changes.  The patient did have a period where she was not using her CPAP machine and was seen in the emergency room.  She now is using the machine.  We continued with the treatment plan.    The patient was seen on September 5, 2024.  At that time she was tolerating the medication and doing fairly well from a mood and behavior standpoint and we were hoping to try a taper some of the Depakote.  I did decrease her Depakote back slightly and we scheduled her for a lab draw.      Current Symptoms:   The patient was unable to participate much today but I did speak  with the staff member Antonia who stated that they did not decrease the Depakote as they felt it was helpful for her.  We did receive a Depakote level of 54 which was reassuring.  Recent other labs were reassuring.  The staff wanted to continue with the current treatment plan so we did not make any changes.  The patient did not appear according to the staff to be significantly depressed.  There was no hallucinations or delusions.  No titi.  No change in sleep or appetite.  No change in cognition.  No obvious side effects or tremors.  We elected to continue with the current treatment plan.  I did inform the patient that I would be leaving the clinic.      Current Medications: Please see chart. Medications personally reviewed.    Medication Compliance: yes    Side Effects to Medications:  no          Mental Status Exam:   Appearance: Staff report no change.  No new tremors or abnormal movements.  Behavior: Patient is cooperative.  At times somewhat disinterested.  No change.  Speech: Vague slow and monotone according to staff with no significant change.  Mood/Affect: No obvious depression or anxiety.  No titi.  Staff has not noted any depression or irritability.    Thought Content:  No evidence of any psychosis. Homicidal Thoughts: None apparent or reported.    Thought Process/Formulation: Participating.  Able to follow conversation.  Tracking and following the conversation well.  Associations:  Grossly intact.  Processing information appropriately.  Continues vague and concrete according to the staff.  Able to track conversation.  Not disorganized.  No racing thoughts..  Fund of Knowledge:  Grossly unchanged.  Attention/Concentration: Concentration appears adequate.  The patient is following and participating well.  Attentive.  No disorganization.  No racing thoughts.  Insight:  Grossly intact.   Judgement:  Grossly intact.   Memory:  Grossly intact.   Motor Status:  No recent change reported. No current  tremor.  Orientation: Grossly oriented..    Diagnosis managed and treated at today's visit :    Psychosis, NOS  Major depressive disorder, recurrent, chronic, moderate, possibly with psychotic features  Intellectual disability, mild      Plan:  Medication Adjustment:  I will make no changes and we will continue with the current dose of the Depakote.  Apparently the decrease in medication at the previous meeting did not occur.  The patient will continue to follow with her medical team.  Recent labs were reviewed and reassuring.  I did inform the staff that I was going to be leaving this clinic.        Continue with the support of the clinic, reassurance, and redirection. Staff monitoring and ongoing assessments per team plan. Current psychotropic medication appears to represent the minimum effective dosage and appears medically necessary. We will continue to monitor and reassess. This team will utilize appropriate emergency services if necessary. I will make myself available if concerns or problems arise.    Marito Matt MD

## 2024-09-26 NOTE — NURSING NOTE
Current patient location: [address] [place] [id number]    Is the patient currently in the state of [place]? YES    Visit mode:TELEPHONE    If the visit is dropped, the patient can be reconnected by: TELEPHONE VISIT: Phone number: [phone]    Will anyone else be joining the visit? NO  (If patient encounters technical issues they should call [phone]    How would you like to obtain your AVS? MyChart    Are changes needed to the allergy or medication list? No    Are refills needed on medications prescribed by this physician? NO    Rooming Documentation:  Staff checked pt in     Reason for visit: Follow Up    Karen KERR